# Patient Record
Sex: FEMALE | Race: BLACK OR AFRICAN AMERICAN | Employment: UNEMPLOYED | URBAN - METROPOLITAN AREA
[De-identification: names, ages, dates, MRNs, and addresses within clinical notes are randomized per-mention and may not be internally consistent; named-entity substitution may affect disease eponyms.]

---

## 2017-06-29 ENCOUNTER — ED HISTORICAL/CONVERTED ENCOUNTER (OUTPATIENT)
Dept: OTHER | Age: 52
End: 2017-06-29

## 2020-07-29 VITALS
OXYGEN SATURATION: 97 % | WEIGHT: 181.9 LBS | HEART RATE: 69 BPM | HEIGHT: 68 IN | BODY MASS INDEX: 27.57 KG/M2 | TEMPERATURE: 97.9 F | SYSTOLIC BLOOD PRESSURE: 178 MMHG | DIASTOLIC BLOOD PRESSURE: 94 MMHG

## 2020-07-29 PROBLEM — I10 HYPERTENSIVE DISORDER: Status: ACTIVE | Noted: 2020-07-29

## 2020-07-29 PROBLEM — Z98.890 HISTORY OF RECENT DENTAL PROCEDURE: Status: ACTIVE | Noted: 2020-07-29

## 2020-07-29 PROBLEM — E78.5 HYPERLIPIDEMIA: Status: ACTIVE | Noted: 2020-07-29

## 2020-07-29 RX ORDER — ERGOCALCIFEROL 1.25 MG/1
50000 CAPSULE ORAL
COMMUNITY
End: 2021-03-11 | Stop reason: ALTCHOICE

## 2020-07-29 RX ORDER — ISOPROPYL ALCOHOL 70 ML/100ML
SWAB TOPICAL
COMMUNITY
End: 2022-02-23 | Stop reason: SDUPTHER

## 2020-07-29 RX ORDER — LIRAGLUTIDE 6 MG/ML
0.6 INJECTION SUBCUTANEOUS
COMMUNITY
End: 2020-09-04

## 2020-07-29 RX ORDER — MECLIZINE HYDROCHLORIDE 25 MG/1
TABLET ORAL
COMMUNITY

## 2020-07-29 RX ORDER — LABETALOL 100 MG/1
TABLET, FILM COATED ORAL 2 TIMES DAILY
COMMUNITY
End: 2021-04-20

## 2020-07-29 RX ORDER — INSULIN GLARGINE 100 [IU]/ML
INJECTION, SOLUTION SUBCUTANEOUS
COMMUNITY
End: 2021-03-11 | Stop reason: SDUPTHER

## 2020-07-29 RX ORDER — LANOLIN ALCOHOL/MO/W.PET/CERES
1000 CREAM (GRAM) TOPICAL DAILY
COMMUNITY
End: 2021-03-11 | Stop reason: ALTCHOICE

## 2020-07-29 RX ORDER — ATORVASTATIN CALCIUM 80 MG/1
80 TABLET, FILM COATED ORAL DAILY
COMMUNITY
End: 2021-03-11 | Stop reason: ALTCHOICE

## 2020-07-29 RX ORDER — METFORMIN HYDROCHLORIDE 500 MG/1
TABLET, FILM COATED, EXTENDED RELEASE ORAL
COMMUNITY
End: 2021-03-11 | Stop reason: ALTCHOICE

## 2020-07-29 RX ORDER — OMEPRAZOLE 20 MG/1
20 CAPSULE, DELAYED RELEASE ORAL DAILY
COMMUNITY

## 2020-07-29 RX ORDER — CALCIUM CITRATE/VITAMIN D3 200MG-6.25
TABLET ORAL SEE ADMIN INSTRUCTIONS
COMMUNITY
End: 2021-03-11 | Stop reason: SDUPTHER

## 2020-07-29 RX ORDER — ACETAMINOPHEN 500 MG
TABLET ORAL
COMMUNITY

## 2020-07-29 RX ORDER — ASPIRIN 81 MG/1
TABLET ORAL DAILY
COMMUNITY
End: 2021-04-20

## 2020-07-29 RX ORDER — LATANOPROST 50 UG/ML
1 SOLUTION/ DROPS OPHTHALMIC
COMMUNITY

## 2020-07-29 RX ORDER — MAGNESIUM 200 MG
1000 TABLET ORAL DAILY
COMMUNITY
End: 2022-02-23

## 2020-07-29 RX ORDER — CLINDAMYCIN HYDROCHLORIDE 150 MG/1
CAPSULE ORAL EVERY 6 HOURS
COMMUNITY
End: 2021-03-11 | Stop reason: ALTCHOICE

## 2020-07-29 RX ORDER — LANCING DEVICE
EACH MISCELLANEOUS
COMMUNITY

## 2020-07-29 RX ORDER — ATORVASTATIN CALCIUM 40 MG/1
TABLET, FILM COATED ORAL DAILY
COMMUNITY
End: 2021-03-11 | Stop reason: ALTCHOICE

## 2020-07-29 RX ORDER — IRBESARTAN 150 MG/1
150 TABLET ORAL
COMMUNITY

## 2020-07-29 RX ORDER — BLOOD-GLUCOSE CONTROL, NORMAL
EACH MISCELLANEOUS
COMMUNITY
End: 2021-03-11 | Stop reason: SDUPTHER

## 2020-07-29 RX ORDER — AMLODIPINE BESYLATE 5 MG/1
5 TABLET ORAL DAILY
COMMUNITY
End: 2021-04-20

## 2020-07-29 RX ORDER — PEN NEEDLE, DIABETIC 30 GX3/16"
NEEDLE, DISPOSABLE MISCELLANEOUS
COMMUNITY
End: 2021-03-11 | Stop reason: SDUPTHER

## 2020-09-04 RX ORDER — LIRAGLUTIDE 6 MG/ML
INJECTION SUBCUTANEOUS
Qty: 6 ML | Refills: 3 | Status: SHIPPED | OUTPATIENT
Start: 2020-09-04 | End: 2021-03-11 | Stop reason: ALTCHOICE

## 2020-09-09 ENCOUNTER — ED HISTORICAL/CONVERTED ENCOUNTER (OUTPATIENT)
Dept: OTHER | Age: 55
End: 2020-09-09

## 2021-03-11 ENCOUNTER — OFFICE VISIT (OUTPATIENT)
Dept: ENDOCRINOLOGY | Age: 56
End: 2021-03-11
Payer: MEDICAID

## 2021-03-11 VITALS
TEMPERATURE: 98.6 F | DIASTOLIC BLOOD PRESSURE: 112 MMHG | HEIGHT: 60 IN | HEART RATE: 73 BPM | WEIGHT: 168 LBS | OXYGEN SATURATION: 98 % | SYSTOLIC BLOOD PRESSURE: 178 MMHG | BODY MASS INDEX: 32.98 KG/M2

## 2021-03-11 DIAGNOSIS — E11.65 TYPE 2 DIABETES MELLITUS WITH HYPERGLYCEMIA, WITH LONG-TERM CURRENT USE OF INSULIN (HCC): Primary | ICD-10-CM

## 2021-03-11 DIAGNOSIS — E78.2 MIXED HYPERLIPIDEMIA: ICD-10-CM

## 2021-03-11 DIAGNOSIS — Z79.4 TYPE 2 DIABETES MELLITUS WITH HYPERGLYCEMIA, WITH LONG-TERM CURRENT USE OF INSULIN (HCC): Primary | ICD-10-CM

## 2021-03-11 DIAGNOSIS — E11.65 UNCONTROLLED TYPE 2 DIABETES MELLITUS WITH HYPERGLYCEMIA (HCC): ICD-10-CM

## 2021-03-11 PROBLEM — Z91.14 NONCOMPLIANCE WITH MEDICATION REGIMEN: Status: ACTIVE | Noted: 2021-03-11

## 2021-03-11 LAB
BILIRUB UR QL STRIP: NEGATIVE
GLUCOSE POC: 324 MG/DL
GLUCOSE UR-MCNC: NEGATIVE MG/DL
HBA1C MFR BLD HPLC: 14 %
KETONES P FAST UR STRIP-MCNC: NEGATIVE MG/DL
PH UR STRIP: 5.5 [PH] (ref 4.6–8)
PROT UR QL STRIP: NEGATIVE
SP GR UR STRIP: 1.03 (ref 1–1.03)
UA UROBILINOGEN AMB POC: NORMAL (ref 0.2–1)
URINALYSIS CLARITY POC: CLEAR
URINALYSIS COLOR POC: YELLOW
URINE BLOOD POC: NEGATIVE
URINE LEUKOCYTES POC: NEGATIVE
URINE NITRITES POC: NEGATIVE

## 2021-03-11 PROCEDURE — 99215 OFFICE O/P EST HI 40 MIN: CPT | Performed by: INTERNAL MEDICINE

## 2021-03-11 PROCEDURE — 82962 GLUCOSE BLOOD TEST: CPT | Performed by: INTERNAL MEDICINE

## 2021-03-11 PROCEDURE — 83036 HEMOGLOBIN GLYCOSYLATED A1C: CPT | Performed by: INTERNAL MEDICINE

## 2021-03-11 PROCEDURE — 81003 URINALYSIS AUTO W/O SCOPE: CPT | Performed by: INTERNAL MEDICINE

## 2021-03-11 RX ORDER — INSULIN GLARGINE 100 [IU]/ML
INJECTION, SOLUTION SUBCUTANEOUS
Qty: 3 ADJUSTABLE DOSE PRE-FILLED PEN SYRINGE | Refills: 3 | Status: SHIPPED | OUTPATIENT
Start: 2021-03-11 | End: 2021-05-13 | Stop reason: SDUPTHER

## 2021-03-11 RX ORDER — CALCIUM CITRATE/VITAMIN D3 200MG-6.25
TABLET ORAL SEE ADMIN INSTRUCTIONS
Qty: 100 STRIP | Refills: 3 | Status: SHIPPED | OUTPATIENT
Start: 2021-03-11 | End: 2022-02-23 | Stop reason: SDUPTHER

## 2021-03-11 RX ORDER — SITAGLIPTIN 100 MG/1
100 TABLET, FILM COATED ORAL DAILY
Qty: 30 TAB | Refills: 3 | Status: SHIPPED | OUTPATIENT
Start: 2021-03-11 | End: 2021-04-10

## 2021-03-11 RX ORDER — IBUPROFEN 800 MG/1
TABLET ORAL
COMMUNITY
Start: 2021-02-09 | End: 2021-04-16 | Stop reason: SINTOL

## 2021-03-11 RX ORDER — PIOGLITAZONEHYDROCHLORIDE 45 MG/1
45 TABLET ORAL DAILY
Qty: 30 TAB | Refills: 3 | Status: SHIPPED | OUTPATIENT
Start: 2021-03-11 | End: 2021-04-10

## 2021-03-11 RX ORDER — OFLOXACIN 3 MG/ML
SOLUTION/ DROPS OPHTHALMIC
COMMUNITY
Start: 2021-02-09

## 2021-03-11 RX ORDER — BLOOD-GLUCOSE CONTROL, NORMAL
EACH MISCELLANEOUS
Qty: 100 LANCET | Refills: 3 | Status: SHIPPED | OUTPATIENT
Start: 2021-03-11 | End: 2022-02-23 | Stop reason: SDUPTHER

## 2021-03-11 RX ORDER — PEN NEEDLE, DIABETIC 30 GX3/16"
NEEDLE, DISPOSABLE MISCELLANEOUS
Qty: 1 PACKAGE | Refills: 3 | Status: SHIPPED | OUTPATIENT
Start: 2021-03-11 | End: 2021-05-13 | Stop reason: SDUPTHER

## 2021-03-11 RX ORDER — ATORVASTATIN CALCIUM 80 MG/1
80 TABLET, FILM COATED ORAL
Qty: 30 TAB | Refills: 3 | Status: SHIPPED | OUTPATIENT
Start: 2021-03-11 | End: 2021-04-10

## 2021-03-11 RX ORDER — SITAGLIPTIN 100 MG/1
TABLET, FILM COATED ORAL
COMMUNITY
Start: 2021-02-09 | End: 2021-03-11 | Stop reason: SDUPTHER

## 2021-03-11 NOTE — LETTER
3/11/2021 Patient: Lori Wick YOB: 1965 Date of Visit: 3/11/2021 Niki Mott NP 
[address] [place] [id number] Via Fax: [phone] Dear Niki Mott NP, Thank you for referring Ms. Alesha Farley to [address] [place] [address] for evaluation. My notes for this consultation are attached. If you have questions, please do not hesitate to call me. I look forward to following your patient along with you. Sincerely, Nikki Luciano MD

## 2021-03-11 NOTE — PROGRESS NOTES
History and Physical    Patient: Alesha Yoon MRN: [id number]  SSN: [id number]    YOB: 1965  Age: 61 y.o. Sex: female      Subjective:      Alesha Yoon is a 61 y.o. female with past medical history of hypertension, hyperlipidemia is here for follow-up of type 2 diabetes mellitus. She was ent to me by primary care provider Claudia Ferguson NP. Patient was seen only once, 13 months back. At the last visit patient was describing excessive hunger. She was started on Victoza 0.6 mg daily and then she was advised to increase it to 1.2 mg daily after a couple weeks. However, when she started to take Victoza, her appetite was suppressed and she tells me that she did not eat anything for 3 days because she had no appetite, she did not like this, so she stopped taking it and she does not want to go back on it. At the last visit I started her on Metformin  mg, advised her to take only 1 tablet with dinner. However, this makes her constipated and flares of her hemorrhoids, so she stopped taking it and she does not want to take it. She is supposed to be on Lantus 30 units daily at bedtime. However, when she takes Lantus, it makes her wake up several times at night and get hungry. So she does not take it much. Patient has polyuria, polydipsia, unintentional weight loss. Overall she is still noncompliant with diabetic diet but she says that she has made some changes, she is trying to use artificial sweeteners instead of sugar in her Karsten-Aid, she tries to go out and walk a lot, so that she does not have access to her kitchen, that way she will eat less. She is trying to decrease the number of crackers she eats, trying to eat more fruit. She tells me that she checks her blood sugar at home, but she did not bring her glucometer and she cannot give me any numbers from her memory. She is due for diabetic eye exam and she needs to call and make an appointment.   Regarding hyperlipidemia: She was on atorvastatin 40 mg daily. However her cholesterol was still high so PCP switched her to pravastatin 20 mg, but pravastatin caused her to have hair fall, so she stopped taking it. Glucometer reading: Patient did not bring her glucometer today    Updated diabetes history:  · Diagnosis: 8 years    · Current treatment: Lantus 30 units at hs,     · Past treatment: metformin ER (constipation and hemorrhoids flaring up), Victoza (decreased appetite that patient doesn't llike)    · Glucose checks: once a day, 200-300s    · Hyperglycemia: yes    · Hypoglycemia: no    · Meals per day: 3, breakfast: sausage, and biscuit, egg, lunch: turkey sandwich, or lunch meat, etc sandwich, dinner: same as lunch: snacks: snacks all day and night, junk food, candy bars, cereal, fruit, sweet tea,     · Exercise: walks    · DM related hospitalizations: no        Complications of DM:    · CAD: no    · CVA: yes, ,     · PVD: no    · Amputations: no     · Retinopathy: no; last exam was 2019    · Gastropathy: no    · Nephropathy: no    · Neuropathy: yes        Medications:    · Statin: Pravastatin 20    · ACE-I: no    · ASA: yes      · Diabetes education: no    Past Medical History:   Diagnosis Date    History of recent dental procedure 2020    Hyperlipidemia 2020    Hypertensive disorder 2020    Type II diabetes mellitus, uncontrolled ([facility] [address] 2020     Past Surgical History:   Procedure Laterality Date    HX  SECTION        Family History   Problem Relation Age of Onset    Diabetes Mother     Diabetes Paternal Grandmother      Social History     Tobacco Use    Smoking status: Current Every Day Smoker    Smokeless tobacco: Never Used   Substance Use Topics    Alcohol use: Not Currently      Prior to Admission medications    Medication Sig Start Date End Date Taking?  Authorizing Provider   ofloxacin (FLOXIN) 0.3 % ophthalmic solution instill 10 DROPS into THE affected ear ONCE A DAY FOR 7 DAYS 2/9/21  Yes Provider, Historical   ibuprofen (MOTRIN) 800 mg tablet TAKE ONE TABLET BY MOUTH THREE TIMES DAILY AS NEEDED WITH FOOD OR MILK 2/9/21  Yes Provider, Historical   pioglitazone (ACTOS) 45 mg tablet Take 1 Tab by mouth daily for 30 days. 3/11/21 4/10/21 Yes Aldo Waters MD   Januvia 100 mg tablet Take 1 Tab by mouth daily for 30 days. 3/11/21 4/10/21 Yes Aldo Waters MD   insulin glargine (Lantus Solostar U-100 Insulin) 100 unit/mL (3 mL) inpn 30 units every morning 3/11/21  Yes Aldo Waters MD   Insulin Needles, Disposable, (BD Ultra-Fine Short Pen Needle) 31 gauge x 5/16\" ndle Once a day 3/11/21  Yes Aldo Waters MD   glucose blood VI test strips (True Metrix Glucose Test Strip) strip by Does Not Apply route See Admin Instructions. Twice a day 3/11/21  Yes Aldo Waters MD   lancets (Ultra-Care Lancets) 30 gauge misc Twice a day 3/11/21  Yes Aldo Waters MD   atorvastatin (LIPITOR) 80 mg tablet Take 1 Tab by mouth nightly for 30 days. 3/11/21 4/10/21 Yes Aldo Waters MD   alcohol swabs (Alcohol Pads) padm by Apply Externally route. Yes Provider, Historical   amLODIPine (NORVASC) 5 mg tablet Take 5 mg by mouth daily. Yes Provider, Historical   aspirin delayed-release 81 mg tablet Take  by mouth daily. Yes Provider, Historical   cyanocobalamin (VITAMIN B-12) 1,000 mcg sublingual tablet Take 1,000 mcg by mouth daily. Yes Provider, Historical   irbesartan (AVAPRO) 150 mg tablet Take 150 mg by mouth nightly. Yes Provider, Historical   labetaloL (NORMODYNE) 100 mg tablet Take  by mouth two (2) times a day. Yes Provider, Historical   Lancing Device misc by Does Not Apply route. Yes Provider, Historical   latanoprost (XALATAN) 0.005 % ophthalmic solution Administer 1 Drop to both eyes nightly. Yes Provider, Historical   meclizine (ANTIVERT) 25 mg tablet Take  by mouth three (3) times daily as needed for Dizziness.    Yes Provider, Historical   omeprazole (PRILOSEC) 20 mg capsule Take 20 mg by mouth daily. Yes Provider, Historical   acetaminophen (TYLENOL) 500 mg tablet Take  by mouth every six (6) hours as needed for Pain. Provider, Historical        Allergies   Allergen Reactions    Penicillins Other (comments)     Yeast Infection    Percocet [Oxycodone-Acetaminophen] Hives       Review of Systems:  ROS    A comprehensive review of systems was preformed and it is negative except mentioned in HPI    Objective:     Vitals:    03/11/21 1009 03/11/21 1020   BP: (!) 173/104 (!) 178/112   Pulse: 75 73   Temp: 98.6 °F (37 °C)    TempSrc: Temporal    SpO2: 98%    Weight: 168 lb (76.2 kg)    Height: 5' (1.524 m)         Physical Exam:    Physical Exam  Vitals signs and nursing note reviewed. Constitutional:       Appearance: Normal appearance. HENT:      Head: Normocephalic and atraumatic. Cardiovascular:      Rate and Rhythm: Normal rate and regular rhythm. Pulmonary:      Effort: Pulmonary effort is normal.      Breath sounds: Normal breath sounds. Neurological:      Mental Status: She is alert. diabetic foot exam:  Bilateral diabetic foot exam was performed today. Dorsalis pedis pulses 2+ bilaterally. Monofilament sensation normal bilaterally. No ulcers or skin breakdown. Labs and Imaging:    Last 3 Recorded Weights in this Encounter    03/11/21 1009   Weight: 168 lb (76.2 kg)        No results found for: HBA1C, HGBE8, SXY5YOBM, ZKJ3ZHYG, ESK4PCNQ     Assessment:     Patient Active Problem List   Diagnosis Code    Type II diabetes mellitus, uncontrolled ([facility] [address] E11.65    Hyperlipidemia E78.5    Hypertensive disorder I10    History of recent dental procedure Z98.890    Type 2 diabetes mellitus with hyperglycemia, with long-term current use of insulin ([facility] [address] E11.65, Z79.4    Noncompliance with medication regimen Z91.14           Plan:     type II diabetes mellitus uncontrolled  Hemoglobin A1c was 11.9% on 1-, 14% today.     Fingerstick blood glucose is 324 mg/dL in my office today. Urine is negative for ketones    Up to date with diabetes related annual labs: no    Up to date with diabetic eye exam: no    Plan:  Since patient does not want to take Lantus at night, advised patient to take Lantus in the morning every day. Continue Januvia 100 mg daily since she is tolerating it well. Start pioglitazone 45 mg daily. Check blood glucose twice a day and bring glucometer to next visit in 6 weeks. Discussed with patient about cutting down on snacking, avoiding sugary beverages. Encouraged patient to call and make appointment for diabetic eye exam.    essential hypertension  Blood pressure is very high today as patient did not take blood pressure medication this morning. Discussed about compliance. Patient has microalbuminuria. mixed hyperlipidemia  Check lipid profile. Previously on atorvastatin 40 mg, which was switched to pravastatin 20 mg by PCP. However, patient has hair fall from pravastatin. So I am switching her back to atorvastatin and I am going to increase it to 80 mg daily to be taken at bedtime. Noncompliance with medication regimen    Time spent with patient: 45 minutes    Orders Placed This Encounter    LIPID PANEL    METABOLIC PANEL, COMPREHENSIVE    TSH RFX ON ABNORMAL TO FREE T4    MICROALBUMIN, UR, RAND W/ MICROALB/CREAT RATIO    AMB POC GLUCOSE BLOOD, BY GLUCOSE MONITORING DEVICE    AMB POC HEMOGLOBIN A1C    AMB POC URINALYSIS DIP STICK AUTO W/O MICRO    pioglitazone (ACTOS) 45 mg tablet     Sig: Take 1 Tab by mouth daily for 30 days. Dispense:  30 Tab     Refill:  3    Januvia 100 mg tablet     Sig: Take 1 Tab by mouth daily for 30 days.      Dispense:  30 Tab     Refill:  3    insulin glargine (Lantus Solostar U-100 Insulin) 100 unit/mL (3 mL) inpn     Si units every morning     Dispense:  3 Adjustable Dose Pre-filled Pen Syringe     Refill:  3    Insulin Needles, Disposable, (BD Ultra-Fine Short Pen Needle) 31 gauge x 516\" ndle     Sig: Once a day     Dispense:  1 Package     Refill:  3    glucose blood VI test strips (True Metrix Glucose Test Strip) strip     Sig: by Does Not Apply route See Admin Instructions. Twice a day     Dispense:  100 Strip     Refill:  3    lancets (Ultra-Care Lancets) 30 gauge misc     Sig: Twice a day     Dispense:  100 Lancet     Refill:  3    atorvastatin (LIPITOR) 80 mg tablet     Sig: Take 1 Tab by mouth nightly for 30 days.      Dispense:  30 Tab     Refill:  3     DC Pravastatin, atorvastatin 40        Signed By: Nikki Luciano MD     March 11, 2021      Return to clinic 6 weeks

## 2021-03-12 LAB
ALBUMIN SERPL-MCNC: 4 G/DL (ref 3.8–4.9)
ALBUMIN/CREAT UR: 6 MG/G CREAT (ref 0–29)
ALBUMIN/GLOB SERPL: 1.3 {RATIO} (ref 1.2–2.2)
ALP SERPL-CCNC: 128 IU/L (ref 39–117)
ALT SERPL-CCNC: 26 IU/L (ref 0–32)
AST SERPL-CCNC: 17 IU/L (ref 0–40)
BILIRUB SERPL-MCNC: 0.2 MG/DL (ref 0–1.2)
BUN SERPL-MCNC: 11 MG/DL (ref 6–24)
BUN/CREAT SERPL: 11 (ref 9–23)
CALCIUM SERPL-MCNC: 9.6 MG/DL (ref 8.7–10.2)
CHLORIDE SERPL-SCNC: 97 MMOL/L (ref 96–106)
CHOLEST SERPL-MCNC: 219 MG/DL (ref 100–199)
CO2 SERPL-SCNC: 25 MMOL/L (ref 20–29)
CREAT SERPL-MCNC: 0.99 MG/DL (ref 0.57–1)
CREAT UR-MCNC: 167.6 MG/DL
GLOBULIN SER CALC-MCNC: 3 G/DL (ref 1.5–4.5)
GLUCOSE SERPL-MCNC: 317 MG/DL (ref 65–99)
HDLC SERPL-MCNC: 44 MG/DL
LDLC SERPL CALC-MCNC: 154 MG/DL (ref 0–99)
MICROALBUMIN UR-MCNC: 10.2 UG/ML
POTASSIUM SERPL-SCNC: 4.5 MMOL/L (ref 3.5–5.2)
PROT SERPL-MCNC: 7 G/DL (ref 6–8.5)
SODIUM SERPL-SCNC: 136 MMOL/L (ref 134–144)
TRIGL SERPL-MCNC: 119 MG/DL (ref 0–149)
TSH SERPL DL<=0.005 MIU/L-ACNC: 0.95 UIU/ML (ref 0.45–4.5)
VLDLC SERPL CALC-MCNC: 21 MG/DL (ref 5–40)

## 2021-04-16 ENCOUNTER — HOSPITAL ENCOUNTER (INPATIENT)
Age: 56
LOS: 4 days | Discharge: HOME | DRG: 044 | End: 2021-04-20
Attending: ANESTHESIOLOGY | Admitting: ANESTHESIOLOGY
Payer: MEDICAID

## 2021-04-16 ENCOUNTER — APPOINTMENT (OUTPATIENT)
Dept: CT IMAGING | Age: 56
DRG: 044 | End: 2021-04-16
Attending: NURSE PRACTITIONER
Payer: MEDICAID

## 2021-04-16 ENCOUNTER — APPOINTMENT (OUTPATIENT)
Dept: GENERAL RADIOLOGY | Age: 56
End: 2021-04-16
Attending: STUDENT IN AN ORGANIZED HEALTH CARE EDUCATION/TRAINING PROGRAM
Payer: MEDICAID

## 2021-04-16 ENCOUNTER — APPOINTMENT (OUTPATIENT)
Dept: CT IMAGING | Age: 56
End: 2021-04-16
Attending: STUDENT IN AN ORGANIZED HEALTH CARE EDUCATION/TRAINING PROGRAM
Payer: MEDICAID

## 2021-04-16 ENCOUNTER — HOSPITAL ENCOUNTER (EMERGENCY)
Age: 56
Discharge: ANOTHER HOSPITAL | End: 2021-04-16
Attending: STUDENT IN AN ORGANIZED HEALTH CARE EDUCATION/TRAINING PROGRAM | Admitting: STUDENT IN AN ORGANIZED HEALTH CARE EDUCATION/TRAINING PROGRAM
Payer: MEDICAID

## 2021-04-16 ENCOUNTER — APPOINTMENT (OUTPATIENT)
Dept: NON INVASIVE DIAGNOSTICS | Age: 56
DRG: 044 | End: 2021-04-16
Attending: NURSE PRACTITIONER
Payer: MEDICAID

## 2021-04-16 ENCOUNTER — APPOINTMENT (OUTPATIENT)
Dept: MRI IMAGING | Age: 56
DRG: 044 | End: 2021-04-16
Attending: NURSE PRACTITIONER
Payer: MEDICAID

## 2021-04-16 VITALS
TEMPERATURE: 98 F | DIASTOLIC BLOOD PRESSURE: 86 MMHG | OXYGEN SATURATION: 100 % | HEIGHT: 60 IN | SYSTOLIC BLOOD PRESSURE: 149 MMHG | HEART RATE: 81 BPM | RESPIRATION RATE: 20 BRPM | WEIGHT: 186 LBS | BODY MASS INDEX: 36.52 KG/M2

## 2021-04-16 DIAGNOSIS — I61.3 NONTRAUMATIC INTRACEREBRAL HEMORRHAGE IN BRAINSTEM, UNSPECIFIED LATERALITY (HCC): ICD-10-CM

## 2021-04-16 DIAGNOSIS — I62.9 ACUTE INTRACRANIAL HEMORRHAGE (HCC): Primary | ICD-10-CM

## 2021-04-16 PROBLEM — I61.9 ICH (INTRACEREBRAL HEMORRHAGE) (HCC): Status: ACTIVE | Noted: 2021-04-16

## 2021-04-16 LAB
ALBUMIN SERPL-MCNC: 2.8 G/DL (ref 3.5–5)
ALBUMIN/GLOB SERPL: 0.7 {RATIO} (ref 1.1–2.2)
ALP SERPL-CCNC: 129 U/L (ref 45–117)
ALT SERPL-CCNC: 30 U/L (ref 12–78)
AMPHET UR QL SCN: NEGATIVE
ANION GAP SERPL CALC-SCNC: 5 MMOL/L (ref 5–15)
APPEARANCE UR: ABNORMAL
AST SERPL W P-5'-P-CCNC: 15 U/L (ref 15–37)
ATRIAL RATE: 65 BPM
BACTERIA URNS QL MICRO: NEGATIVE /HPF
BARBITURATES UR QL SCN: NEGATIVE
BASOPHILS # BLD: 0 K/UL (ref 0–0.1)
BASOPHILS NFR BLD: 1 % (ref 0–1)
BENZODIAZ UR QL: NEGATIVE
BILIRUB SERPL-MCNC: 0.2 MG/DL (ref 0.2–1)
BILIRUB UR QL: NEGATIVE
BUN SERPL-MCNC: 12 MG/DL (ref 6–20)
BUN/CREAT SERPL: 10 (ref 12–20)
CA-I BLD-MCNC: 8.7 MG/DL (ref 8.5–10.1)
CALCULATED P AXIS, ECG09: 43 DEGREES
CALCULATED R AXIS, ECG10: -11 DEGREES
CALCULATED T AXIS, ECG11: 133 DEGREES
CANNABINOIDS UR QL SCN: NEGATIVE
CHLORIDE SERPL-SCNC: 105 MMOL/L (ref 97–108)
CHOLEST SERPL-MCNC: 200 MG/DL
CO2 SERPL-SCNC: 26 MMOL/L (ref 21–32)
COCAINE UR QL SCN: POSITIVE
COLOR UR: ABNORMAL
CREAT SERPL-MCNC: 1.22 MG/DL (ref 0.55–1.02)
DIAGNOSIS, 93000: NORMAL
DIFFERENTIAL METHOD BLD: ABNORMAL
DRUG SCRN COMMENT,DRGCM: ABNORMAL
ECHO AO ROOT DIAM: 2.83 CM
ECHO AV AREA PEAK VELOCITY: 2.02 CM2
ECHO AV AREA VTI: 2.31 CM2
ECHO AV AREA/BSA PEAK VELOCITY: 1.1 CM2/M2
ECHO AV AREA/BSA VTI: 1.3 CM2/M2
ECHO AV MEAN GRADIENT: 12.6 MMHG
ECHO AV PEAK GRADIENT: 21.64 MMHG
ECHO AV PEAK VELOCITY: 232.58 CM/S
ECHO AV VTI: 36.58 CM
ECHO LA AREA 4C: 13.45 CM2
ECHO LA MAJOR AXIS: 3.49 CM
ECHO LA MINOR AXIS: 1.93 CM
ECHO LA VOL 2C: 50.96 ML (ref 22–52)
ECHO LA VOL 4C: 30.42 ML (ref 22–52)
ECHO LA VOL BP: 41.72 ML (ref 22–52)
ECHO LA VOL/BSA BIPLANE: 23.05 ML/M2 (ref 16–28)
ECHO LA VOLUME INDEX A2C: 28.16 ML/M2 (ref 16–28)
ECHO LA VOLUME INDEX A4C: 16.81 ML/M2 (ref 16–28)
ECHO LV INTERNAL DIMENSION DIASTOLIC: 3.76 CM (ref 3.9–5.3)
ECHO LV INTERNAL DIMENSION SYSTOLIC: 2.37 CM
ECHO LV IVSD: 1.39 CM (ref 0.6–0.9)
ECHO LV MASS 2D: 179.6 G (ref 67–162)
ECHO LV MASS INDEX 2D: 99.2 G/M2 (ref 43–95)
ECHO LV POSTERIOR WALL DIASTOLIC: 1.3 CM (ref 0.6–0.9)
ECHO LVOT DIAM: 1.95 CM
ECHO LVOT PEAK GRADIENT: 9.86 MMHG
ECHO LVOT PEAK VELOCITY: 157.02 CM/S
ECHO LVOT SV: 84.6 ML
ECHO LVOT VTI: 28.24 CM
ECHO MV A VELOCITY: 126.56 CM/S
ECHO MV AREA PHT: 3.91 CM2
ECHO MV E DECELERATION TIME (DT): 194.25 MS
ECHO MV E VELOCITY: 105.96 CM/S
ECHO MV E/A RATIO: 0.84
ECHO MV PRESSURE HALF TIME (PHT): 56.33 MS
ECHO PV PEAK INSTANTANEOUS GRADIENT SYSTOLIC: 4.39 MMHG
ECHO RV INTERNAL DIMENSION: 3.17 CM
ECHO RV TAPSE: 1.93 CM (ref 1.5–2)
ECHO TV REGURGITANT MAX VELOCITY: 257.96 CM/S
ECHO TV REGURGITANT PEAK GRADIENT: 26.62 MMHG
EOSINOPHIL # BLD: 0.1 K/UL (ref 0–0.4)
EOSINOPHIL NFR BLD: 2 % (ref 0–7)
ERYTHROCYTE [DISTWIDTH] IN BLOOD BY AUTOMATED COUNT: 15.2 % (ref 11.5–14.5)
GLOBULIN SER CALC-MCNC: 4.2 G/DL (ref 2–4)
GLUCOSE BLD STRIP.AUTO-MCNC: 245 MG/DL (ref 65–100)
GLUCOSE BLD STRIP.AUTO-MCNC: 267 MG/DL (ref 65–100)
GLUCOSE BLD STRIP.AUTO-MCNC: 316 MG/DL (ref 65–100)
GLUCOSE BLD STRIP.AUTO-MCNC: 351 MG/DL (ref 65–100)
GLUCOSE SERPL-MCNC: 351 MG/DL (ref 65–100)
GLUCOSE UR STRIP.AUTO-MCNC: >300 MG/DL
HCT VFR BLD AUTO: 42.2 % (ref 35–47)
HDLC SERPL-MCNC: 62 MG/DL
HDLC SERPL: 3.2 {RATIO} (ref 0–5)
HGB BLD-MCNC: 13.9 G/DL (ref 11.5–16)
HGB UR QL STRIP: NEGATIVE
IMM GRANULOCYTES # BLD AUTO: 0 K/UL (ref 0–0.04)
IMM GRANULOCYTES NFR BLD AUTO: 1 % (ref 0–0.5)
INR PPP: 0.9 (ref 0.9–1.1)
KETONES UR QL STRIP.AUTO: NEGATIVE MG/DL
LDLC SERPL CALC-MCNC: 123 MG/DL (ref 0–100)
LEUKOCYTE ESTERASE UR QL STRIP.AUTO: NEGATIVE
LIPID PROFILE,FLP: ABNORMAL
LYMPHOCYTES # BLD: 2.6 K/UL (ref 0.8–3.5)
LYMPHOCYTES NFR BLD: 42 % (ref 12–49)
MCH RBC QN AUTO: 28.4 PG (ref 26–34)
MCHC RBC AUTO-ENTMCNC: 32.9 G/DL (ref 30–36.5)
MCV RBC AUTO: 86.1 FL (ref 80–99)
METHADONE UR QL: NEGATIVE
MONOCYTES # BLD: 0.6 K/UL (ref 0–1)
MONOCYTES NFR BLD: 11 % (ref 5–13)
NEUTS SEG # BLD: 2.6 K/UL (ref 1.8–8)
NEUTS SEG NFR BLD: 43 % (ref 32–75)
NITRITE UR QL STRIP.AUTO: NEGATIVE
NRBC # BLD: 0 K/UL (ref 0–0.01)
NRBC BLD-RTO: 0 PER 100 WBC
OPIATES UR QL: NEGATIVE
P-R INTERVAL, ECG05: 186 MS
PCP UR QL: NEGATIVE
PERFORMED BY, TECHID: ABNORMAL
PH UR STRIP: 6 [PH] (ref 5–8)
PLATELET # BLD AUTO: 401 K/UL (ref 150–400)
PMV BLD AUTO: 10.3 FL (ref 8.9–12.9)
POTASSIUM SERPL-SCNC: 4.1 MMOL/L (ref 3.5–5.1)
PROT SERPL-MCNC: 7 G/DL (ref 6.4–8.2)
PROT UR STRIP-MCNC: NEGATIVE MG/DL
PROTHROMBIN TIME: 12.5 SEC (ref 11.9–14.7)
Q-T INTERVAL, ECG07: 444 MS
QRS DURATION, ECG06: 88 MS
QTC CALCULATION (BEZET), ECG08: 461 MS
RBC # BLD AUTO: 4.9 M/UL (ref 3.8–5.2)
RBC #/AREA URNS HPF: ABNORMAL /HPF (ref 0–5)
SERVICE CMNT-IMP: ABNORMAL
SODIUM SERPL-SCNC: 136 MMOL/L (ref 136–145)
SP GR UR REFRACTOMETRY: 1.03 (ref 1–1.03)
TRIGL SERPL-MCNC: 75 MG/DL (ref ?–150)
TROPONIN I SERPL-MCNC: <0.05 NG/ML
UROBILINOGEN UR QL STRIP.AUTO: 0.1 EU/DL (ref 0.1–1)
VENTRICULAR RATE, ECG03: 65 BPM
VLDLC SERPL CALC-MCNC: 15 MG/DL
WBC # BLD AUTO: 6 K/UL (ref 3.6–11)
WBC URNS QL MICRO: ABNORMAL /HPF (ref 0–4)

## 2021-04-16 PROCEDURE — 84484 ASSAY OF TROPONIN QUANT: CPT

## 2021-04-16 PROCEDURE — 70450 CT HEAD/BRAIN W/O DYE: CPT

## 2021-04-16 PROCEDURE — 93306 TTE W/DOPPLER COMPLETE: CPT

## 2021-04-16 PROCEDURE — 80053 COMPREHEN METABOLIC PANEL: CPT

## 2021-04-16 PROCEDURE — 71045 X-RAY EXAM CHEST 1 VIEW: CPT

## 2021-04-16 PROCEDURE — 93005 ELECTROCARDIOGRAM TRACING: CPT

## 2021-04-16 PROCEDURE — 97161 PT EVAL LOW COMPLEX 20 MIN: CPT

## 2021-04-16 PROCEDURE — 74011000250 HC RX REV CODE- 250: Performed by: NURSE PRACTITIONER

## 2021-04-16 PROCEDURE — 99223 1ST HOSP IP/OBS HIGH 75: CPT | Performed by: PSYCHIATRY & NEUROLOGY

## 2021-04-16 PROCEDURE — 81001 URINALYSIS AUTO W/SCOPE: CPT

## 2021-04-16 PROCEDURE — 36415 COLL VENOUS BLD VENIPUNCTURE: CPT

## 2021-04-16 PROCEDURE — 85025 COMPLETE CBC W/AUTO DIFF WBC: CPT

## 2021-04-16 PROCEDURE — 99285 EMERGENCY DEPT VISIT HI MDM: CPT

## 2021-04-16 PROCEDURE — 74011250636 HC RX REV CODE- 250/636: Performed by: NURSE PRACTITIONER

## 2021-04-16 PROCEDURE — 75810000275 HC EMERGENCY DEPT VISIT NO LEVEL OF CARE

## 2021-04-16 PROCEDURE — 74011250636 HC RX REV CODE- 250/636: Performed by: STUDENT IN AN ORGANIZED HEALTH CARE EDUCATION/TRAINING PROGRAM

## 2021-04-16 PROCEDURE — 96376 TX/PRO/DX INJ SAME DRUG ADON: CPT

## 2021-04-16 PROCEDURE — APPNB30 APP NON BILLABLE TIME 0-30 MINS: Performed by: NURSE PRACTITIONER

## 2021-04-16 PROCEDURE — 97530 THERAPEUTIC ACTIVITIES: CPT

## 2021-04-16 PROCEDURE — 80307 DRUG TEST PRSMV CHEM ANLYZR: CPT

## 2021-04-16 PROCEDURE — 82962 GLUCOSE BLOOD TEST: CPT

## 2021-04-16 PROCEDURE — 92610 EVALUATE SWALLOWING FUNCTION: CPT | Performed by: SPEECH-LANGUAGE PATHOLOGIST

## 2021-04-16 PROCEDURE — 80061 LIPID PANEL: CPT

## 2021-04-16 PROCEDURE — 65610000006 HC RM INTENSIVE CARE

## 2021-04-16 PROCEDURE — 74011636637 HC RX REV CODE- 636/637: Performed by: INTERNAL MEDICINE

## 2021-04-16 PROCEDURE — 74011636637 HC RX REV CODE- 636/637: Performed by: NURSE PRACTITIONER

## 2021-04-16 PROCEDURE — 96374 THER/PROPH/DIAG INJ IV PUSH: CPT

## 2021-04-16 PROCEDURE — 70551 MRI BRAIN STEM W/O DYE: CPT

## 2021-04-16 PROCEDURE — 74011250637 HC RX REV CODE- 250/637: Performed by: STUDENT IN AN ORGANIZED HEALTH CARE EDUCATION/TRAINING PROGRAM

## 2021-04-16 PROCEDURE — 97165 OT EVAL LOW COMPLEX 30 MIN: CPT

## 2021-04-16 PROCEDURE — 85610 PROTHROMBIN TIME: CPT

## 2021-04-16 RX ORDER — HYDRALAZINE HYDROCHLORIDE 20 MG/ML
10 INJECTION INTRAMUSCULAR; INTRAVENOUS ONCE
Status: COMPLETED | OUTPATIENT
Start: 2021-04-16 | End: 2021-04-16

## 2021-04-16 RX ORDER — NALOXONE HYDROCHLORIDE 0.4 MG/ML
0.4 INJECTION, SOLUTION INTRAMUSCULAR; INTRAVENOUS; SUBCUTANEOUS AS NEEDED
Status: DISCONTINUED | OUTPATIENT
Start: 2021-04-16 | End: 2021-04-20 | Stop reason: HOSPADM

## 2021-04-16 RX ORDER — PANTOPRAZOLE SODIUM 40 MG/1
40 TABLET, DELAYED RELEASE ORAL
Status: DISCONTINUED | OUTPATIENT
Start: 2021-04-16 | End: 2021-04-20 | Stop reason: HOSPADM

## 2021-04-16 RX ORDER — INSULIN LISPRO 100 [IU]/ML
INJECTION, SOLUTION INTRAVENOUS; SUBCUTANEOUS EVERY 6 HOURS
Status: DISCONTINUED | OUTPATIENT
Start: 2021-04-16 | End: 2021-04-16

## 2021-04-16 RX ORDER — HYDRALAZINE HYDROCHLORIDE 20 MG/ML
20 INJECTION INTRAMUSCULAR; INTRAVENOUS ONCE
Status: COMPLETED | OUTPATIENT
Start: 2021-04-16 | End: 2021-04-16

## 2021-04-16 RX ORDER — DEXTROSE 50 % IN WATER (D50W) INTRAVENOUS SYRINGE
25-50 AS NEEDED
Status: DISCONTINUED | OUTPATIENT
Start: 2021-04-16 | End: 2021-04-20 | Stop reason: HOSPADM

## 2021-04-16 RX ORDER — LABETALOL HYDROCHLORIDE 5 MG/ML
10 INJECTION, SOLUTION INTRAVENOUS
Status: DISCONTINUED | OUTPATIENT
Start: 2021-04-16 | End: 2021-04-16

## 2021-04-16 RX ORDER — ONDANSETRON 2 MG/ML
4 INJECTION INTRAMUSCULAR; INTRAVENOUS
Status: DISCONTINUED | OUTPATIENT
Start: 2021-04-16 | End: 2021-04-20 | Stop reason: HOSPADM

## 2021-04-16 RX ORDER — POLYETHYLENE GLYCOL 3350 17 G/17G
17 POWDER, FOR SOLUTION ORAL DAILY PRN
Status: DISCONTINUED | OUTPATIENT
Start: 2021-04-16 | End: 2021-04-20 | Stop reason: HOSPADM

## 2021-04-16 RX ORDER — MAGNESIUM SULFATE 100 %
4 CRYSTALS MISCELLANEOUS AS NEEDED
Status: DISCONTINUED | OUTPATIENT
Start: 2021-04-16 | End: 2021-04-20 | Stop reason: HOSPADM

## 2021-04-16 RX ORDER — MECLIZINE HCL 12.5 MG 12.5 MG/1
25 TABLET ORAL
Status: DISCONTINUED | OUTPATIENT
Start: 2021-04-16 | End: 2021-04-17 | Stop reason: SDUPTHER

## 2021-04-16 RX ORDER — HYDRALAZINE HYDROCHLORIDE 20 MG/ML
10 INJECTION INTRAMUSCULAR; INTRAVENOUS
Status: COMPLETED | OUTPATIENT
Start: 2021-04-16 | End: 2021-04-16

## 2021-04-16 RX ORDER — SODIUM CHLORIDE 0.9 % (FLUSH) 0.9 %
5-40 SYRINGE (ML) INJECTION AS NEEDED
Status: DISCONTINUED | OUTPATIENT
Start: 2021-04-16 | End: 2021-04-20 | Stop reason: HOSPADM

## 2021-04-16 RX ORDER — ACETAMINOPHEN 325 MG/1
650 TABLET ORAL
Status: DISCONTINUED | OUTPATIENT
Start: 2021-04-16 | End: 2021-04-20 | Stop reason: HOSPADM

## 2021-04-16 RX ORDER — LABETALOL HYDROCHLORIDE 5 MG/ML
10 INJECTION, SOLUTION INTRAVENOUS
Status: COMPLETED | OUTPATIENT
Start: 2021-04-16 | End: 2021-04-16

## 2021-04-16 RX ORDER — INSULIN LISPRO 100 [IU]/ML
INJECTION, SOLUTION INTRAVENOUS; SUBCUTANEOUS
Status: DISCONTINUED | OUTPATIENT
Start: 2021-04-16 | End: 2021-04-20 | Stop reason: HOSPADM

## 2021-04-16 RX ORDER — HYDRALAZINE HYDROCHLORIDE 20 MG/ML
10 INJECTION INTRAMUSCULAR; INTRAVENOUS
Status: DISCONTINUED | OUTPATIENT
Start: 2021-04-16 | End: 2021-04-16

## 2021-04-16 RX ORDER — SODIUM CHLORIDE 0.9 % (FLUSH) 0.9 %
5-40 SYRINGE (ML) INJECTION EVERY 8 HOURS
Status: DISCONTINUED | OUTPATIENT
Start: 2021-04-16 | End: 2021-04-20 | Stop reason: HOSPADM

## 2021-04-16 RX ORDER — LABETALOL 100 MG/1
100 TABLET, FILM COATED ORAL 2 TIMES DAILY
Status: DISCONTINUED | OUTPATIENT
Start: 2021-04-16 | End: 2021-04-20

## 2021-04-16 RX ADMIN — INSULIN LISPRO 2 UNITS: 100 INJECTION, SOLUTION INTRAVENOUS; SUBCUTANEOUS at 22:18

## 2021-04-16 RX ADMIN — HYDRALAZINE HYDROCHLORIDE 10 MG: 20 INJECTION INTRAMUSCULAR; INTRAVENOUS at 03:10

## 2021-04-16 RX ADMIN — INSULIN LISPRO 5 UNITS: 100 INJECTION, SOLUTION INTRAVENOUS; SUBCUTANEOUS at 12:36

## 2021-04-16 RX ADMIN — INSULIN LISPRO 7 UNITS: 100 INJECTION, SOLUTION INTRAVENOUS; SUBCUTANEOUS at 17:45

## 2021-04-16 RX ADMIN — ONDANSETRON 4 MG: 2 INJECTION INTRAMUSCULAR; INTRAVENOUS at 10:41

## 2021-04-16 RX ADMIN — HYDRALAZINE HYDROCHLORIDE 10 MG: 20 INJECTION INTRAMUSCULAR; INTRAVENOUS at 21:58

## 2021-04-16 RX ADMIN — HYDRALAZINE HYDROCHLORIDE 20 MG: 20 INJECTION INTRAMUSCULAR; INTRAVENOUS at 02:24

## 2021-04-16 RX ADMIN — Medication 10 ML: at 22:09

## 2021-04-16 RX ADMIN — Medication 10 ML: at 06:00

## 2021-04-16 RX ADMIN — HYDRALAZINE HYDROCHLORIDE 10 MG: 20 INJECTION INTRAMUSCULAR; INTRAVENOUS at 21:36

## 2021-04-16 RX ADMIN — PANTOPRAZOLE SODIUM 40 MG: 40 TABLET, DELAYED RELEASE ORAL at 22:33

## 2021-04-16 RX ADMIN — LABETALOL HYDROCHLORIDE 10 MG: 5 INJECTION INTRAVENOUS at 22:07

## 2021-04-16 NOTE — ROUTINE PROCESS
TRANSFER - OUT REPORT:    Verbal report given to Johny Mora RN(name) on April R Brandon Howe  being transferred to 7S ICU(unit) for routine progression of care       Report consisted of patients Situation, Background, Assessment and   Recommendations(SBAR). Information from the following report(s) SBAR, ED Summary and MAR was reviewed with the receiving nurse. Lines:   Peripheral IV 04/15/21 Right Hand (Active)   Site Assessment Clean, dry, & intact 04/16/21 0951   Phlebitis Assessment 0 04/16/21 0951   Infiltration Assessment 0 04/16/21 0951   Dressing Status Clean, dry, & intact 04/16/21 0951       Peripheral IV 04/15/21 Right Forearm (Active)   Site Assessment Clean, dry, & intact 04/16/21 0952   Phlebitis Assessment 0 04/16/21 0952   Infiltration Assessment 0 04/16/21 0952   Dressing Status Clean, dry, & intact 04/16/21 8059        Opportunity for questions and clarification was provided.       Patient transported with:   Registered Nurse

## 2021-04-16 NOTE — CONSULTS
Neurology Consult  Maryan Méndez NP    Patient: Alesha Cabrera MRN: [id number]  SSN: [id number]    YOB: 1965  Age: 61 y.o. Sex: female      Chief Complaint:Double vision, facial numbness, bilateral finger numbness, and generalized weakness. Subjective:      Alesha Cabrera is a 61 y.o. female with a past history of DMII, CVA with no residual effects, HTN, hyperlipidemia, recent ear infection and smoking. States that Wednesday night while she was watching television she began having numbness of her nose and area around her mouth. She started having tingling in her fingers bilaterally. Generalized weakness gradually ensued and she could not stand. She also began having blurred and double vision that has been going on for the last 24 hours. Patient had a stroke in the past so she decided she to get evaluated in the ER. Patient had an elevated BP on arrival of 190/83. She was taken for [facility] which showed acute andressa hemorrhage 1 x 2 x 1.5 cm. No hydrocephalus. Dr. Antoni Vargas with [facility] was consulted. No neurosurgical intervention is indicated at this time. She was transferred to [facility] ER for a higher level of care where she is awaiting an ICU bed. Patient denies any antiplatelet or blood thinning medications. She denies trauma. Past Medical History:   Diagnosis Date    History of recent dental procedure 7/29/2020    Hyperlipidemia 7/29/2020    Hypertensive disorder 7/29/2020    Murmur     Stroke [facility])     Type II diabetes mellitus, uncontrolled ([address] 7/29/2020     Family History   Problem Relation Age of Onset    Diabetes Mother     Diabetes Paternal Grandmother      Social History     Tobacco Use    Smoking status: Current Every Day Smoker     Packs/day: 0.50    Smokeless tobacco: Never Used   Substance Use Topics    Alcohol use: Yes     Comment: daily      Prior to Admission Medications   Prescriptions Last Dose Informant Patient Reported? Taking?    Insulin Needles, Disposable, (BD Ultra-Fine Short Pen Needle) 31 gauge x 16\" ndle   No No   Sig: Once a day   Lancing Device misc   Yes No   Sig: by Does Not Apply route. acetaminophen (TYLENOL) 500 mg tablet   Yes No   Sig: Take  by mouth every six (6) hours as needed for Pain. alcohol swabs (Alcohol Pads) padm   Yes No   Sig: by Apply Externally route. amLODIPine (NORVASC) 5 mg tablet   Yes No   Sig: Take 5 mg by mouth daily. aspirin delayed-release 81 mg tablet   Yes No   Sig: Take  by mouth daily. cyanocobalamin (VITAMIN B-12) 1,000 mcg sublingual tablet   Yes No   Sig: Take 1,000 mcg by mouth daily. glucose blood VI test strips (True Metrix Glucose Test Strip) strip   No No   Sig: by Does Not Apply route See Admin Instructions. Twice a day   insulin glargine (Lantus Solostar U-100 Insulin) 100 unit/mL (3 mL) inpn   No No   Si units every morning   irbesartan (AVAPRO) 150 mg tablet   Yes No   Sig: Take 150 mg by mouth nightly. labetaloL (NORMODYNE) 100 mg tablet   Yes No   Sig: Take  by mouth two (2) times a day. lancets (Ultra-Care Lancets) 30 gauge misc   No No   Sig: Twice a day   latanoprost (XALATAN) 0.005 % ophthalmic solution   Yes No   Sig: Administer 1 Drop to both eyes nightly. meclizine (ANTIVERT) 25 mg tablet   Yes No   Sig: Take  by mouth three (3) times daily as needed for Dizziness. ofloxacin (FLOXIN) 0.3 % ophthalmic solution   Yes No   Sig: instill 10 DROPS into THE affected ear ONCE A DAY FOR 7 DAYS   omeprazole (PRILOSEC) 20 mg capsule   Yes No   Sig: Take 20 mg by mouth daily. Facility-Administered Medications: None       Allergies   Allergen Reactions    Penicillins Other (comments)     Yeast Infection    Percocet [Oxycodone-Acetaminophen] Hives       Review of Systems:  Denies  chest pain, leg pain, nausea, vomiting, difficulty swallowing, headache, and dyspnea. Complains of numbness, tingling, and double vision.      Objective:     Vitals:    21 0500 21 0515 21 0530 21 0553   BP: (!) 142/72  (!) 141/59    Pulse: 73 75 73    Resp: 17 17 20    Temp:       SpO2:  99% 100%    Weight:    84.4 kg (186 lb)   Height:    5' (1.524 m)      Physical Exam:  GENERAL: Calm, cooperative, NAD  SKIN: Warm, dry, color appropriate for ethnicity. Neurologic Exam:  Mental Status:  Alert and oriented x 4. Appropriate affect, mood and behavior. Language:    Normal fluency, repetition, comprehension and naming. Cranial Nerves:   Pupils 3 mm, equal, round and reactive to light. Visual fields full to confrontation. Extraocular movements intact. Facial sensation intact. Full facial strength, no asymmetry. Hearing grossly intact bilaterally. Mild dysarthria. Tongue protrudes to midline, palate elevates symmetrically. Shoulder shrug 5/5 bilaterally. Motor:    No pronator drift. Bulk and tone normal.      5/5 power in all extremities proximally and distally. No involuntary movements. Sensation:    Sensation intact throughout to light touch to cheeks, bilateral arms and bilateral lower legs. Numbness is stated to be on nose, around mouth and in fingers. Coordination & Gait: Normal. FTN and HTS intact with no ataxia present.     NIHSS:      1a-LOC:0    1b-Month/Age:0    1c-Open/Close Hand:0    2-Best Gaze:0    3-Visual Fields:0    4-Facial Palsy:0    5a-Left Arm:0    5b-Right Arm:0    6a-Left Le    6b-Right Le    7-Limb Ataxia:0    8-Sensory:1    9-Best Language:0    10-Dysarthria:1    11-Extinction/Inattention:0  TOTAL SCORE:2    Labs:  Lab Results   Component Value Date/Time    WBC 6.0 2021 01:47 AM    HGB 13.9 2021 01:47 AM    HCT 42.2 2021 01:47 AM    PLATELET 960 (H)  01:47 AM    MCV 86.1 2021 01:47 AM      Lab Results   Component Value Date/Time    Sodium 136 2021 01:47 AM    Potassium 4.1 2021 01:47 AM    Chloride 105 2021 01:47 AM    CO2 26 2021 01:47 AM    Anion gap 5 04/16/2021 01:47 AM    Glucose 351 (H) 04/16/2021 01:47 AM    BUN 12 04/16/2021 01:47 AM    Creatinine 1.22 (H) 04/16/2021 01:47 AM    BUN/Creatinine ratio 10 (L) 04/16/2021 01:47 AM    GFR est AA 55 (L) 04/16/2021 01:47 AM    GFR est non-AA 45 (L) 04/16/2021 01:47 AM    Calcium 8.7 04/16/2021 01:47 AM     Lab Results   Component Value Date/Time    Troponin-I, Qt. <0.05 04/16/2021 01:47 AM       Imaging:  CT Results (maximum last 3): Results from Hospital Encounter encounter on 04/16/21   CT HEAD WO CONT    Narrative HISTORY:  facial numbness, r/o cva  Dose reduction technique: All CT scans at this facility are performed using dose reduction optimization  technique as appropriate on the exam including the following: Automated exposure  control, adjustment of the MA and/or KV according to patient size and/or use of  iterative reconstructive technique. TECHNIQUE: [Without contrast]  COMPARISON: [None]  LIMITATIONS: [None]    BRAIN: Moderate patchy areas of subcortical and periventricular white matter  hypodensity. Irregular shaped focus of hyperdensity in the andressa measures up to  1.0 x 2.0 x 1.5 cm. VENTRICLES: No hydrocephalus. EXTRA-AXIAL SPACES/SULCI:  No extra-axial hemorrhage, fluid collection or mass. CALVARIUM/SKULL BASE: Normal    FACE/SINUSES: Heterogeneous partially hypodense and partially calcified  opacification of the right maxillary sinus. SOFT TISSUES: Normal    OTHER: None      Impression Findings compatible with acute andressa hemorrhage. Moderate patchy  white matter abnormality as described is nonspecific but could indicate sequela  of chronic small vessel disease. Chronic heterogeneous and partially hyperdense  opacification of the right maxillary sinus may indicate complex infection such  as fungal sinusitis.     Results called to Jacek Jordan on 4/16/2021 1:57 AM.     Assessment:     Hospital Problems  Date Reviewed: 3/11/2021          Codes Class Noted POA    ICH (intracerebral hemorrhage) ([facility] [address] ICD-10-CM: I61.9  ICD-9-CM: 421  4/16/2021 Unknown            Plan:   1) Intracerebral Hemorrhage, ICH score: 1   - CT head showed acute andressa hemorrhage. Moderate patchy  white matter abnormality as described is nonspecific but could indicate sequela of chronic small vessel disease. Chronic heterogeneous and partially hyperdense opacification of the right maxillary sinus may indicate complex infection such as fungal sinusitis. - SBP goal less than 160, Cardene/Labetalol PRN   - q1 neuro checks    - A1C and lipid panel pending, LDL goal <70   -Repeat CTH this am              - MRI brain ordered              - ECHO ordered              - PT/OT/SLP evals              - Stroke education   - NSGY following, no intervention indicated at this time. I have discussed the diagnosis and the intended plan as seen in the above orders with Dr. Loco Haas, the patient and the primary RN. Patient/family updated on current plan of care and is in agreement. All questions were answered. Thank you for this consult and participating in the care of this patient. Signed By: Kai Navarro NP     April 16, 2021      Staff Addendum:  I have reviewed the documentation provided by the nurse practitioner, discussed her findings, clinical impression, and the proposed management plans with regards to this encounter. I have personally evaluated the patient and verified the history and confirmed the physical findings. Below are my additional findings:    61year old AAF with a h/o HTN, HPL, DM, remote stroke, tobacco/substance abuse presenting with reported onset of face numbness/tingling b/l, hand numbness/tingling b/l, difficulty standing due to generalized weakness, dysarthria and diplopia which she first noted 2 days ago (4/14/21). Symptoms are largely unchanged from onset. BP on arrival was 190/83. Tox screen was positive for cocaine which she tells me she last used 1 week ago.  CT head/MRI Brain this admission noted an acute pontine ICH with associated edema, remote R thalamic infarct and R maxillary sinusitis. She was taking ASA 81mg daily PTA. On examination, she exhibits mild dysarthria without aphasia, PERRL, EOMI, VFF, face symmetric/facial sensation intact b/l, tongue ML, palate symmetric, LEY 5/5, sensation intact to LT throughout, DTR 2+/4 b/l UE/LE. Suspect hypertensive hemorrhage due to location and uncontrolled HTN on admission. Maintain SBP<160. Start high dose statin therapy. Hold antiplatelet therapy for at least 2 weeks in the setting of ICH-may consider resuming thereafter. PT/OT/ST evaluations. Stat head CT if any neurologic change/new focal deficits.      Breanna Glasgow DO  04/16/21

## 2021-04-16 NOTE — ED NOTES
Assumed care of patient. Verbal and bedside report received from Jm Ponce, [address]. Patient resting quietly on stretcher, visitor at bedside. Pt appears in no acute distress, respirations equal and unlabored. VS WNL. Call bell within reach.

## 2021-04-16 NOTE — ED NOTES
Patient incontinent of stool. Patient clean and skin protectant ointment applied to patients buttocks.

## 2021-04-16 NOTE — H&P
[facility]    ICU TEAM H&P    Name: Eugene Dutta   : 1965   MRN: [id number]   Date: 2021      Assessment:     ICU Problems:    1. Pontine Hemorrhage  2. Hypertension  3. Type 2 DM  4. Hyperlipidemia  5. Cocaine use  6. Tobacco abuse    Imaging:  CT Results  (Last 48 hours)               21 0137  CT HEAD WO CONT Final result    Impression:  Findings compatible with acute andressa hemorrhage. Moderate patchy   white matter abnormality as described is nonspecific but could indicate sequela   of chronic small vessel disease. Chronic heterogeneous and partially hyperdense   opacification of the right maxillary sinus may indicate complex infection such   as fungal sinusitis. Results called to Colleen Olmstead on 2021 1:57 AM.       Narrative:  HISTORY:  facial numbness, r/o cva   Dose reduction technique: All CT scans at this facility are performed using dose reduction optimization   technique as appropriate on the exam including the following: Automated exposure   control, adjustment of the MA and/or KV according to patient size and/or use of   iterative reconstructive technique. TECHNIQUE: [Without contrast]   COMPARISON: [None]   LIMITATIONS: [None]       BRAIN: Moderate patchy areas of subcortical and periventricular white matter   hypodensity. Irregular shaped focus of hyperdensity in the andressa measures up to   1.0 x 2.0 x 1.5 cm. VENTRICLES: No hydrocephalus. EXTRA-AXIAL SPACES/SULCI:  No extra-axial hemorrhage, fluid collection or mass. CALVARIUM/SKULL BASE: Normal       FACE/SINUSES: Heterogeneous partially hypodense and partially calcified   opacification of the right maxillary sinus. SOFT TISSUES: Normal       OTHER: None                   ICU Comprehensive Plan of Care:     Plans for this Shift:     1. Admit to ICU  2. Consult to neurology and neurosurgery  3. q1h neurological checks  4.  Cardene is needed to maintain SBP< 160mmHg  5. Repeat CT head today, obtain MRI as well  6. PT/OT/SLP  7. Smoking and drug cessation counseling  8. SBP Goal of: < 160 mmHg  9. MAP Goal of: 65-85 mmHg  10. Nicardipine (Cardene) - For above SBP/MAP goals  11. IVFs:   12. Transfusion Trigger (Hgb): <7 g/dL  13. Respiratory Goals:  a. N/A  14. Pulmonary toilet: Incentive Spirometry   15. SpO2 Goal: > 92%  16. Keep K>4; Mg>2   17. PT/OT: PT consulted and on board, OT consulted and on board and Speech therapy consulted and on board   18. Goals of Care Discussion with family Yes   23. Plan of Care/Code Status: Full Code  20. Appreciate Consultants Input   21. Discussed Care Plan with Bedside RN  22. Documentation of Current Medications  23. Rest of Plan Below:    F - Feeding:  Pending   A - Analgesia: Fentanyl and Acetaminophen  S - Sedation: N/A  T - DVT Prophylaxis: SCD's or Sequential Compression Device   H - Head of Bed: > 30 Degrees  U - Ulcer Prophylaxis: Not at this time   G - Glycemic Control: Insulin  S - Spontaneous Breathing Trial: N/A  B - Bowel Regimen: MiraLax  I - Indwelling Catheter:   Tubes: None  Lines: Peripheral IV  Drains: None  D - De-escalation of Antibiotics: None at this time    Subjective:   Progress Note: 4/16/2021      Reason for ICU Admission: Pontine hemorrhage       HPI:Alesha Kent is a 61 y.o. female with a past history of DMII, CVA with no residual effects, HTN, hyperlipidemia, recent ear infection and smoking. States that Wednesday night while she was watching television she began having numbness of her nose and area around her mouth. She started having tingling in her fingers bilaterally. Generalized weakness gradually ensued and she could not stand. She also began having blurred and double vision that has been going on for the last 24 hours. Patient had a stroke in the past so she decided she to get evaluated in the ER. Patient had an elevated BP on arrival of 190/83.  She was taken [facility] which showed acute andressa hemorrhage 1 x 2 x 1.5 cm. No hydrocephalus. Dr. Armand Harkins with [facility] was consulted. No neurosurgical intervention is indicated at this time. She was transferred to [facility] ER for a higher level of care where she is awaiting an ICU bed. POD:  * No surgery found *    S/P:       Active Problem List:     Problem List  Date Reviewed: 3/11/2021          Codes Class    ICH (intracerebral hemorrhage) ([facility] ICD-10-CM: I61.9  ICD-9-CM: 471         Type 2 diabetes mellitus with hyperglycemia, with long-term current use of insulin (HCC) ICD-10-CM: E11.65, Z79.4  ICD-9-CM: 250.00, 790.29, V58.67         Noncompliance with medication regimen ICD-10-CM: Z91.14  ICD-9-CM: V15.81         Type II diabetes mellitus, uncontrolled ([facility] ICD-10-CM: E11.65  ICD-9-CM: 250.02         Hyperlipidemia ICD-10-CM: E78.5  ICD-9-CM: 272.4         Hypertensive disorder ICD-10-CM: I10  ICD-9-CM: 401.9         History of recent dental procedure ICD-10-CM: Z98.890  ICD-9-CM: V15.29               Past Medical History:      has a past medical history of History of recent dental procedure (2020), Hyperlipidemia (2020), Hypertensive disorder (2020), Murmur, Stroke ([facility] and Type II diabetes mellitus, uncontrolled ([facility] (2020). Past Surgical History:      has a past surgical history that includes hx  section; hx orthopaedic; and hx appendectomy. Home Medications:     Prior to Admission medications    Medication Sig Start Date End Date Taking?  Authorizing Provider   ofloxacin (FLOXIN) 0.3 % ophthalmic solution instill 10 DROPS into THE affected ear ONCE A DAY FOR 7 DAYS 21   Provider, Historical   insulin glargine (Lantus Solostar U-100 Insulin) 100 unit/mL (3 mL) inpn 30 units every morning 3/11/21   Jes Cardona MD   Insulin Needles, Disposable, (BD Ultra-Fine Short Pen Needle) 31 gauge x 5/16\" ndle Once a day 3/11/21   Jes Cardona MD   glucose blood VI test strips (True Metrix Glucose Test Strip) strip by Does Not Apply route See Admin Instructions. Twice a day 3/11/21   Kendy Diana MD   lancets (Ultra-Care Lancets) 30 gauge misc Twice a day 3/11/21   Kendy Diana MD   acetaminophen (TYLENOL) 500 mg tablet Take  by mouth every six (6) hours as needed for Pain. Provider, Historical   alcohol swabs (Alcohol Pads) padm by Apply Externally route. Provider, Historical   amLODIPine (NORVASC) 5 mg tablet Take 5 mg by mouth daily. Provider, Historical   aspirin delayed-release 81 mg tablet Take  by mouth daily. Provider, Historical   cyanocobalamin (VITAMIN B-12) 1,000 mcg sublingual tablet Take 1,000 mcg by mouth daily. Provider, Historical   irbesartan (AVAPRO) 150 mg tablet Take 150 mg by mouth nightly. Provider, Historical   labetaloL (NORMODYNE) 100 mg tablet Take  by mouth two (2) times a day. Provider, Historical   Lancing Device misc by Does Not Apply route. Provider, Historical   latanoprost (XALATAN) 0.005 % ophthalmic solution Administer 1 Drop to both eyes nightly. Provider, Historical   meclizine (ANTIVERT) 25 mg tablet Take  by mouth three (3) times daily as needed for Dizziness. Provider, Historical   omeprazole (PRILOSEC) 20 mg capsule Take 20 mg by mouth daily. Provider, Historical       Allergies/Social/Family History: Allergies   Allergen Reactions    Penicillins Other (comments)     Yeast Infection    Percocet [Oxycodone-Acetaminophen] Hives      Social History     Tobacco Use    Smoking status: Current Every Day Smoker     Packs/day: 0.50    Smokeless tobacco: Never Used   Substance Use Topics    Alcohol use: Yes     Comment: daily      Family History   Problem Relation Age of Onset    Diabetes Mother     Diabetes Paternal Grandmother        Review of Systems:     A comprehensive review of systems was negative except for that written in the HPI.     Objective:   Vital Signs:  Visit Vitals  BP (!) 141/59   Pulse 73   Temp 98.7 °F (37.1 °C)   Resp 20   SpO2 100% Temp (24hrs), Av.4 °F (36.9 °C), Min:98 °F (36.7 °C), Max:98.7 °F (37.1 °C)           Intake/Output:   No intake or output data in the 24 hours ending 21 0539    Physical Exam:    General:  Alert, cooperative, well noursished, well developed, appears stated age   Eyes:  Sclera anicteric. Pupils equally round and reactive to light. Mouth/Throat: Mucous membranes normal, oral pharynx clear   Neck: Supple   Lungs:   Clear to auscultation bilaterally, good effort   CV:  Regular rate and rhythm,no murmur, click, rub or gallop   Abdomen:   Soft, non-tender. bowel sounds normal. non-distended   Extremities: No cyanosis or edema   Skin: Skin color, texture, turgor normal. no acute rash or lesions   Lymph nodes: Cervical and supraclavicular normal   Musculoskeletal: No swelling or deformity   Lines/Devices:  Intact, no erythema, drainage or tenderness   Psych: Alert and oriented, normal mood affect given the setting       LABS AND  DATA: Personally reviewed  Recent Labs     21  014   WBC 6.0   HGB 13.9   HCT 42.2   *     Recent Labs     21      K 4.1      CO2 26   BUN 12   CREA 1.22*   *   CA 8.7     Recent Labs     21   *   TP 7.0   ALB 2.8*   GLOB 4.2*     Recent Labs     21  0204   INR 0.9   PTP 12.5      No results for input(s): PHI, PCO2I, PO2I, FIO2I in the last 72 hours. Recent Labs     21  014   TROIQ <0.05       Hemodynamics:   PAP:   CO:     Wedge:   CI:     CVP:    SVR:       PVR:       Ventilator Settings:  Mode Rate Tidal Volume Pressure FiO2 PEEP                    Peak airway pressure:      Minute ventilation:          MEDS: Reviewed    Chest X-Ray:  CXR Results  (Last 48 hours)               21 005  XR CHEST PORT Final result    Impression:      Negative. Narrative: Indication: Fatigue. AP semiupright portable chest radiograph 0050 hours 2021. Comparison . Clear lungs.    Normal heart size.   No pleural effusion or pneumothorax. Thoracic spondylosis. ECHO:      Multidisciplinary Rounds Completed:  Pending    ABCDEF Bundle/Checklist Completed:  Yes    SPECIAL EQUIPMENT  None    DISPOSITION  Stay in ICU    CRITICAL CARE CONSULTANT NOTE  I had a face to face encounter with the patient, reviewed and interpreted patient data including clinical events, labs, images, vital signs, I/O's, and examined patient. I have discussed the case and the plan and management of the patient's care with the consulting services, the bedside nurses and the respiratory therapist.      NOTE OF PERSONAL INVOLVEMENT IN CARE   This patient has a high probability of imminent, clinically significant deterioration, which requires the highest level of preparedness to intervene urgently. I participated in the decision-making and personally managed or directed the management of the following life and organ supporting interventions that required my frequent assessment to treat or prevent imminent deterioration. I personally spent 60 minutes of critical care time. This is time spent at this critically ill patient's bedside actively involved in patient care as well as the coordination of care and discussions with the patient's family. This does not include any procedural time which has been billed separately.       Alivia Pelletier [facility]     Critical Care Medicine  [facility]

## 2021-04-16 NOTE — PROGRESS NOTES
Stroke Coordinator  [facility] Consult     1.) HPI: Patient states symptoms started Wednesday night while she was watching television. She started having numbness of her nose and around her mouth, as well as bilateral hands. She is having double vision and generalized weakness    2.) Medical hx  DMII, CVA, HTN, hyperlipidemia, recent ear infection     3.) Traumatic ICH? NO (see ICH score)                   3a. ) ICH Score:     GCS:15 (0)      ICH Volume: 1 x 2 x 1.5cm =1.5cc (0)      IVH:No (0)      Location:[place] (1)      Age: 61 (0)    ICH SCORE:1    Imaging:   CT head: Acute robert hemorrhage. Moderate patchy  white matter abnormality as described is nonspecific but could indicate sequela of chronic small vessel disease. Chronic heterogeneous and partially hyperdense opacification of the right maxillary sinus may indicate complex infection such  as fungal sinusitis. Plan:  Neurology consult placed. Admitted to the ICU for close monitoring. No hydrocephalus. No neurosurgical intervention appropriate at this time. Discussed with:   Baldemar Franco NP     NIHSS:      1a-LOC:0    1b-Month/Age:0    1c-Open/Close Hand:0    2-Best Gaze:0    3-Visual Fields:0    4-Facial Palsy:0    5a-Left Arm:0    5b-Right Arm:0    6a-Left Le    6b-Right Le    7-Limb Ataxia:0    8-Sensory:1    9-Best Language:0    10-Dysarthria:1    11-Extinction/Inattention:0  TOTAL SCORE:2  Time spent: 30 minutes.      Roxann Lozoya NP  Neurocritical Care Nurse Practitioner  [phone]

## 2021-04-16 NOTE — CONSULTS
Pt with 3 day history of symptoms. H/o htn and previous strokes. Ct with pontine hemorrhage, no hydrocephalus. NO neurosurgical intervention appropriate at this time.   Recommend neurology and medicine consult and bp control to sbp<160

## 2021-04-16 NOTE — PROGRESS NOTES
Brief note, full report to follow. Swallowing evaluation completed. Recommend regular diet. Ibrahima Inman M.CD.  CCC-SLP

## 2021-04-16 NOTE — PROGRESS NOTES
TRANSITIONS OF CARE PLAN:   1. RUR: 10%  2. DESTINATION: TBD  3. TRANSPORT: Daughter  4. NEEDS FOR DISCHARGE: Additional Resources for substance abuse  5. ANTICIPATED FOLLOW UPS: PCP, Neuro, OP Substance Abuse Support  6. ONGOING INPATIENT NEEDS: Q1 Neuro Checks, Neuro Consult, Repeat Head CT, PT/OT/SLP Evals, Brain MRI, Echo    Anticipated Discharge is: 24-48 Hours    Reason for Admission:  [address]                RUR Score:  10%                   Plan for utilizing home health:      TBD    PCP: First and Last name:  Landen Pop NP     Name of Practice: [facility]   Are you a current patient: Yes/No: Yes   Approximate date of last visit: 1 month ago   Can you participate in a virtual visit with your PCP: yes                    Current Advanced Directive/Advance Care Plan: Full Code      Healthcare Decision Maker:   Click here to complete [address] including selection of the [address] Relationship (ie \"Primary\")           4 children: Mya Matthews, John [place], and [employer]                  Transition of Care Plan:    CM notes CM Consult for Assistance with Discharge Planning. Patient was transferred from [facility] 4/15 for Pontine Hemorrhage. Patient lives alone in a first floor apartment, no exterior steps. Patient is independent in ADLs; patient's family transports patient, or she uses public transportation. Home DME includes: glucometer. Pharmacy preference is [facility] in [facility]. Patient has 4 adult children, whom all live in the immediate area: [facility], Gladys Samuel, John [place], and [employer]. Patient does not have an AMD and declined completion but agreed to completion of ACP. Patient has no hx of HH or Rehab. Patient identified that she has not worked since 2014 due to [facility] of Stroke, elevated BP, and not well controlled DM.   CM discussed that hospital staff would not be able to assist with application process for disability, but patient's Medicaid SW should have further information re: disability application process. Patient identified that she has used marijuana, drank, and cocaine since age 5. Her longest period of sobriety was 1.5 years. Patient has no hx of addiction rehab services. Patient would be open to information re: OP sobriety support services. CM discussed that OP addiction rehab services are provided by local [facility] via rapid access programming. CM placed information on AVS.                      Care Management Interventions  PCP Verified by CM: Yes(last seen by NP Nikolas Gannon 1 month ago)  Palliative Care Criteria Met (RRAT>21 & CHF Dx)?: No  Mode of Transport at Discharge:  Other (see comment)(daughter)  Transition of Care Consult (CM Consult): Discharge Planning  MyChart Signup: No  Discharge Durable Medical Equipment: No(has glucometer)  Health Maintenance Reviewed: Yes(cm met with patient, with patient alert and oriented x4)  Physical Therapy Consult: Yes  Occupational Therapy Consult: Yes  Speech Therapy Consult: Yes  Current Support Network: Own Home, Lives Alone, Family Lives Nearby  Confirm Follow Up Transport: Family(independent in ADLs, family transports)  [address] Provided?: No  Discharge Location  Discharge Placement: Unable to determine at this time(lives alone in a first floor apartment, no steps)    CRM: Reyes Ingles, MPH, [address] [place] [address]; Z: [phone]
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
## 2021-04-16 NOTE — ED PROVIDER NOTES
EMERGENCY DEPARTMENT HISTORY AND PHYSICAL EXAM      Date: 4/16/2021  Patient Name: Butch Morton    History of Presenting Illness     Chief Complaint   Patient presents with    Fatigue       History Provided By: Patient and Patient's Daughter    HPI: April R Jeimy Griggs, 61 y.o. female with a past medical history significant diabetes, hypertension and stroke presents to the ED with cc of weakness, numbness tingling to hands and face. Patient states approximately 2 days ago noticed some numbness tingling to her face, numbness tingling to bilateral hands and generalized weakness, denies any focal extremity weakness states that she was just having trouble getting up out of chairs walking around. As patient has a history of CVA no residual deficits, decided to come to emergency department for evaluation. Patient denies any headaches nausea vomiting is complaining of some blurred and double vision over the last day. Denies any chest pains palpitations or shortness of breath. There are no other complaints, changes, or physical findings at this time. PCP: Manjula Mckeon NP    Current Outpatient Medications   Medication Sig Dispense Refill    ofloxacin (FLOXIN) 0.3 % ophthalmic solution instill 10 DROPS into THE affected ear ONCE A DAY FOR 7 DAYS      ibuprofen (MOTRIN) 800 mg tablet TAKE ONE TABLET BY MOUTH THREE TIMES DAILY AS NEEDED WITH FOOD OR MILK      insulin glargine (Lantus Solostar U-100 Insulin) 100 unit/mL (3 mL) inpn 30 units every morning 3 Adjustable Dose Pre-filled Pen Syringe 3    Insulin Needles, Disposable, (BD Ultra-Fine Short Pen Needle) 31 gauge x 5/16\" ndle Once a day 1 Package 3    glucose blood VI test strips (True Metrix Glucose Test Strip) strip by Does Not Apply route See Admin Instructions.  Twice a day 100 Strip 3    lancets (Ultra-Care Lancets) 30 gauge misc Twice a day 100 Lancet 3    acetaminophen (TYLENOL) 500 mg tablet Take  by mouth every six (6) hours as needed for Pain.  alcohol swabs (Alcohol Pads) padm by Apply Externally route.  amLODIPine (NORVASC) 5 mg tablet Take 5 mg by mouth daily.  aspirin delayed-release 81 mg tablet Take  by mouth daily.  cyanocobalamin (VITAMIN B-12) 1,000 mcg sublingual tablet Take 1,000 mcg by mouth daily.  irbesartan (AVAPRO) 150 mg tablet Take 150 mg by mouth nightly.  labetaloL (NORMODYNE) 100 mg tablet Take  by mouth two (2) times a day.  Lancing Device misc by Does Not Apply route.  latanoprost (XALATAN) 0.005 % ophthalmic solution Administer 1 Drop to both eyes nightly.  meclizine (ANTIVERT) 25 mg tablet Take  by mouth three (3) times daily as needed for Dizziness.  omeprazole (PRILOSEC) 20 mg capsule Take 20 mg by mouth daily. Past History     Past Medical History:  Past Medical History:   Diagnosis Date    History of recent dental procedure 2020    Hyperlipidemia 2020    Hypertensive disorder 2020    Murmur     Stroke [facility])     Type II diabetes mellitus, uncontrolled ([address] 2020       Past Surgical History:  Past Surgical History:   Procedure Laterality Date    HX APPENDECTOMY      HX  SECTION      HX ORTHOPAEDIC         Family History:  Family History   Problem Relation Age of Onset    Diabetes Mother     Diabetes Paternal Grandmother        Social History:  Social History     Tobacco Use    Smoking status: Current Every Day Smoker     Packs/day: 0.50    Smokeless tobacco: Never Used   Substance Use Topics    Alcohol use: Yes     Comment: daily    Drug use: Not Currently     Types: Marijuana       Allergies: Allergies   Allergen Reactions    Penicillins Other (comments)     Yeast Infection    Percocet [Oxycodone-Acetaminophen] Hives         Review of Systems     Review of Systems   Constitutional: Negative for activity change, appetite change and fever. HENT: Negative for congestion and sore throat.     Eyes: Positive for visual disturbance. Negative for photophobia. Respiratory: Negative for cough, chest tightness and shortness of breath. Cardiovascular: Negative for chest pain and palpitations. Gastrointestinal: Negative for nausea and vomiting. Musculoskeletal: Positive for neck pain. Negative for arthralgias, back pain and neck stiffness. Neurological: Positive for weakness and numbness. Negative for dizziness, syncope and headaches. Physical Exam     Physical Exam  Vitals signs and nursing note reviewed. Constitutional:       General: She is not in acute distress. Appearance: Normal appearance. She is normal weight. She is not toxic-appearing. HENT:      Head: Normocephalic and atraumatic. Nose: Nose normal.      Mouth/Throat:      Mouth: Mucous membranes are moist.      Pharynx: Oropharynx is clear. Eyes:      Extraocular Movements: Extraocular movements intact. Conjunctiva/sclera: Conjunctivae normal.      Pupils: Pupils are equal, round, and reactive to light. Neck:      Musculoskeletal: Normal range of motion and neck supple. No neck rigidity. Cardiovascular:      Rate and Rhythm: Normal rate and regular rhythm. Pulses: Normal pulses. Pulmonary:      Effort: Pulmonary effort is normal.      Breath sounds: Normal breath sounds. Abdominal:      General: Abdomen is flat. Palpations: Abdomen is soft. Musculoskeletal: Normal range of motion. General: No tenderness. Lymphadenopathy:      Cervical: No cervical adenopathy. Skin:     General: Skin is warm and dry. Capillary Refill: Capillary refill takes less than 2 seconds. Coloration: Skin is not pale. Neurological:      General: No focal deficit present. Mental Status: She is alert and oriented to person, place, and time. Mental status is at baseline. Cranial Nerves: No cranial nerve deficit. Sensory: No sensory deficit. Motor: No weakness.       Coordination: Coordination normal. Psychiatric:         Mood and Affect: Mood normal.         Behavior: Behavior normal.         Diagnostic Study Results     Labs -     Recent Results (from the past 12 hour(s))   GLUCOSE, POC    Collection Time: 04/16/21 12:22 AM   Result Value Ref Range    Glucose (POC) 351 (H) 65 - 100 mg/dL    Performed by Mika Pall    CBC WITH AUTOMATED DIFF    Collection Time: 04/16/21  1:47 AM   Result Value Ref Range    WBC 6.0 3.6 - 11.0 K/uL    RBC 4.90 3.80 - 5.20 M/uL    HGB 13.9 11.5 - 16.0 g/dL    HCT 42.2 35.0 - 47.0 %    MCV 86.1 80.0 - 99.0 FL    MCH 28.4 26.0 - 34.0 PG    MCHC 32.9 30.0 - 36.5 g/dL    RDW 15.2 (H) 11.5 - 14.5 %    PLATELET 573 (H) 647 - 400 K/uL    MPV 10.3 8.9 - 12.9 FL    NRBC 0.0 0  WBC    ABSOLUTE NRBC 0.00 0.00 - 0.01 K/uL    NEUTROPHILS 43 32 - 75 %    LYMPHOCYTES 42 12 - 49 %    MONOCYTES 11 5 - 13 %    EOSINOPHILS 2 0 - 7 %    BASOPHILS 1 0 - 1 %    IMMATURE GRANULOCYTES 1 (H) 0.0 - 0.5 %    ABS. NEUTROPHILS 2.6 1.8 - 8.0 K/UL    ABS. LYMPHOCYTES 2.6 0.8 - 3.5 K/UL    ABS. MONOCYTES 0.6 0.0 - 1.0 K/UL    ABS. EOSINOPHILS 0.1 0.0 - 0.4 K/UL    ABS. BASOPHILS 0.0 0.0 - 0.1 K/UL    ABS. IMM. GRANS. 0.0 0.00 - 0.04 K/UL    DF AUTOMATED     METABOLIC PANEL, COMPREHENSIVE    Collection Time: 04/16/21  1:47 AM   Result Value Ref Range    Sodium 136 136 - 145 mmol/L    Potassium 4.1 3.5 - 5.1 mmol/L    Chloride 105 97 - 108 mmol/L    CO2 26 21 - 32 mmol/L    Anion gap 5 5 - 15 mmol/L    Glucose 351 (H) 65 - 100 mg/dL    BUN 12 6 - 20 mg/dL    Creatinine 1.22 (H) 0.55 - 1.02 mg/dL    BUN/Creatinine ratio 10 (L) 12 - 20      GFR est AA 55 (L) >60 ml/min/1.73m2    GFR est non-AA 45 (L) >60 ml/min/1.73m2    Calcium 8.7 8.5 - 10.1 mg/dL    Bilirubin, total 0.2 0.2 - 1.0 mg/dL    AST (SGOT) 15 15 - 37 U/L    ALT (SGPT) 30 12 - 78 U/L    Alk.  phosphatase 129 (H) 45 - 117 U/L    Protein, total 7.0 6.4 - 8.2 g/dL    Albumin 2.8 (L) 3.5 - 5.0 g/dL    Globulin 4.2 (H) 2.0 - 4.0 g/dL    A-G Ratio 0.7 (L) 1.1 - 2.2     TROPONIN I    Collection Time: 04/16/21  1:47 AM   Result Value Ref Range    Troponin-I, Qt. <0.05 <0.05 ng/mL   PROTHROMBIN TIME + INR    Collection Time: 04/16/21  2:04 AM   Result Value Ref Range    Prothrombin time 12.5 11.9 - 14.7 sec    INR 0.9 0.9 - 1.1         Radiologic Studies -   [unfilled]  CT Results  (Last 48 hours)               04/16/21 0137  CT HEAD WO CONT Final result    Impression:  Findings compatible with acute andressa hemorrhage. Moderate patchy   white matter abnormality as described is nonspecific but could indicate sequela   of chronic small vessel disease. Chronic heterogeneous and partially hyperdense   opacification of the right maxillary sinus may indicate complex infection such   as fungal sinusitis. Results called to Denzil Romberg on 4/16/2021 1:57 AM.       Narrative:  HISTORY:  facial numbness, r/o cva   Dose reduction technique: All CT scans at this facility are performed using dose reduction optimization   technique as appropriate on the exam including the following: Automated exposure   control, adjustment of the MA and/or KV according to patient size and/or use of   iterative reconstructive technique. TECHNIQUE: [Without contrast]   COMPARISON: [None]   LIMITATIONS: [None]       BRAIN: Moderate patchy areas of subcortical and periventricular white matter   hypodensity. Irregular shaped focus of hyperdensity in the andressa measures up to   1.0 x 2.0 x 1.5 cm. VENTRICLES: No hydrocephalus. EXTRA-AXIAL SPACES/SULCI:  No extra-axial hemorrhage, fluid collection or mass. CALVARIUM/SKULL BASE: Normal       FACE/SINUSES: Heterogeneous partially hypodense and partially calcified   opacification of the right maxillary sinus. SOFT TISSUES: Normal       OTHER: None               CXR Results  (Last 48 hours)               04/16/21 0055  XR CHEST PORT Final result    Impression:      Negative. Narrative: Indication: Fatigue. AP semiupright portable chest radiograph 0050 hours 4/16/2021. Comparison 2016. Clear lungs. Normal heart size. No pleural effusion or pneumothorax. Thoracic spondylosis. Medical Decision Making and ED Course   I am the first provider for this patient. I reviewed the vital signs, available nursing notes, past medical history, past surgical history, family history and social history. Vital Signs-Reviewed the patient's vital signs. Patient Vitals for the past 12 hrs:   Temp Pulse Resp BP SpO2   04/16/21 0317  74 20 (!) 151/70 100 %   04/16/21 0310  76  (!) 169/90    04/16/21 0303  81 18 (!) 169/90 100 %   04/16/21 0234  71 18 (!) 184/72 100 %   04/16/21 0226     100 %   04/16/21 0224  62  (!) 190/83    04/16/21 0219  (!) 59 18 (!) 190/83 100 %   04/16/21 0028 98 °F (36.7 °C) 64 16 (!) 161/96 96 %       EKG interpretation: (Preliminary)  Normal sinus rhythm 65, no ST elevations, LVH, T wave inversions in lateral leads. Records Reviewed: Nursing Notes    The patient presents with numbness tingling in face, hands, generalized weakness with a differential diagnosis of CVA, hypoglycemia, electrolyte abnormality, intracranial hemorrhage      Provider Notes (Medical Decision Making):     MDM   39-year-old female, past medical history of diabetes hypertension CVA, presents emergency department for 2 days of facial numbness and numbness, generalized weakness. Physical exam shows well-appearing female, no distress, stable vitals mild hypertension blood pressure 161/96, neurological exam shows no focal neurological deficits,    We will draw basic lab work, head CT, continue to monitor. ED Course:   Initial assessment performed. The patients presenting problems have been discussed, and they are in agreement with the care plan formulated and outlined with them. I have encouraged them to ask questions as they arise throughout their visit.     ED Course as of Apr 16 7925   Fri Apr 16, 2021   0215 Patient's head CT resulted by radiology, concerning for acute andressa bleed. I informed patient and family regarding results, patient again denies any headache no focal neurological deficits noted. Discussed necessity for transfer to higher level of care, patient amenable to transfer. Neurosurgery paged through [facility] core transfer axis line, anticipate transfer to [facility].    [PZ]   0221 BP BP noted to be 190/83, pulse 59, hydralazine 20 mg IV ordered    [PZ]   0239 Discussed case with Dr. Les Pillai, neurosurgeon at [facility], patient does not require any acute neurosurgical intervention, recommends admission to ICU with neurology evaluation, blood pressure control, systolic should be below 793. [PZ]   [phone] Discussed case with ED attending [facility], directed me to ICU NP Irene Elias, presented case, accepted for admission under Beto Haywood. Will transfer patient to [facility] ED to board, as there currently are no ICU beds available. [PZ]      ED Course User Index  [PZ] Indy Michele MD         Procedures       Siobhan Morrissey MD  Procedures                 Disposition       Transferred to Another Facility        Diagnosis     Clinical Impression:   1. Acute intracranial hemorrhage (HCC)        Attestations:    Siobhan Morrissey MD    Please note that this dictation was completed with [employer], the computer voice recognition software. Quite often unanticipated grammatical, syntax, homophones, and other interpretive errors are inadvertently transcribed by the computer software. Please disregard these errors. Please excuse any errors that have escaped final proofreading. Thank you.

## 2021-04-16 NOTE — ED TRIAGE NOTES
Pt arrived from [facility] for a hemorrhagic stroke and HTN. Per EMs she has a hx of HTN, did not fall, and is not on blood thinners.

## 2021-04-16 NOTE — PROGRESS NOTES
Problem: Mobility Impaired (Adult and Pediatric)  Goal: *Acute Goals and Plan of Care (Insert Text)  Description: FUNCTIONAL STATUS PRIOR TO ADMISSION: Patient was independent and active without use of DME.    HOME SUPPORT PRIOR TO ADMISSION: The patient lived alone, did not require assist though daughter is available to assist when needed. Physical Therapy Goals  Initiated 4/16/2021  1. Patient will move from supine to sit and sit to supine , scoot up and down, and roll side to side in bed with modified independence within 7 day(s). 2.  Patient will transfer from bed to chair and chair to bed with modified independence using the least restrictive device within 7 day(s). 3.  Patient will perform sit to stand with modified independence within 7 day(s). 4.  Patient will ambulate with independence for 300 feet with the least restrictive device within 7 day(s). 5.  Patient will ascend/descend 3 stairs with handrail(s) with modified independence within 7 day(s). Outcome: Not Met   PHYSICAL THERAPY EVALUATION  Patient: April R Scott Coleman (61 y.o. female)  Date: 4/16/2021  Primary Diagnosis: [facility] [I61.9]        Precautions:  Fall    ASSESSMENT  Based on the objective data described below, the patient presents with impaired functional mobility compared to baseline s/p admission w/ ICH. CT shows acute andressa hemorrhage. Transport arrived during this session to take patient to MRI. Currently functional mobility is limited by c/o light headedness, mildly impaired stand balance, generalized weakness though grossly functional with LE's and  equal, decreased tolerance to activity, and HTN with upright. Functionally, patient required min assist supine<->sit, up to min assist sit<->stand, able to take a few steps from bed<->BSC with min assist.  Unable to assess ambulation this session due to patient being taken to MRI. Will address next session(s). Therapy will follow.       Current Level of Function Impacting Discharge (mobility/balance): Up to min assist    Functional Outcome Measure: The patient scored 45/100 on the Barthel outcome measure which is indicative of 55% impairment in functional mobility and ADLs. Other factors to consider for discharge: Lives alone, supportive family available to assist.      Patient will benefit from skilled therapy intervention to address the above noted impairments. PLAN :  Recommendations and Planned Interventions: bed mobility training, transfer training, gait training, therapeutic exercises, neuromuscular re-education, patient and family training/education and therapeutic activities      Frequency/Duration: Patient will be followed by physical therapy:  5 times a week to address goals. Recommendation for discharge: (in order for the patient to meet his/her long term goals)  To be determined: pending gait assessment and progress    This discharge recommendation:  Has not yet been discussed the attending provider and/or case management    IF patient discharges home will need the following DME: none anticipated         SUBJECTIVE:   Patient stated I'm don't feel good.     OBJECTIVE DATA SUMMARY:   HISTORY:    Past Medical History:   Diagnosis Date    History of recent dental procedure 2020    Hyperlipidemia 2020    Hypertensive disorder 2020    Murmur     Stroke [facility])     Type II diabetes mellitus, uncontrolled ([facility] [address] 2020     Past Surgical History:   Procedure Laterality Date    HX APPENDECTOMY      HX  SECTION      HX ORTHOPAEDIC         Personal factors and/or comorbidities impacting plan of care: PMH (repts h/o vertigo)    Home Situation  Home Environment: Apartment  # Steps to Enter:  3(or elevator)  One/Two Story Residence: One story  Living Alone: Yes  Support Systems: Child(drew)  Patient Expects to be Discharged to[de-identified] Unknown  Current DME Used/Available at Home: Shower chair, Grab bars  Tub or [employer] Type: Shower    EXAMINATION/PRESENTATION/DECISION MAKING:   Critical Behavior:  Neurologic State: Alert  Orientation Level: Oriented X4  Cognition: Appropriate decision making     Hearing:  intact  Skin:  Areas exposed intact  Edema: none noted  Range Of Motion:  AROM: Grossly functional LE's. Strength:    Strength: Generally decreased, functional(RLE=LLE,  strong & =)                    Tone & Sensation:   Tone: Normal              Sensation: Intact(and smmetrical BLE to light touch)               Coordination:  Unable to assess (transport arrived). Appears functional based on observation during toileting and transfer  Vision:   Pt voiced double vision. Able to correctly detect number fingers held up  Wears glasses (progressives)  Able to track in all planes  Functional Mobility:  Bed Mobility:     Supine to Sit: Minimum assistance  Sit to Supine: Minimum assistance  Scooting: Stand-by assistance  Transfers:  Sit to Stand: Minimum assistance  Stand to Sit: Contact guard assistance        Bed to Chair [facility]): Minimum assistance;Assist x1  VC for hand placement to control descent        Balance:   Sitting: Intact; Without support  Standing: Impaired; Without support  Standing - Static: Fair;Occasional  Standing - Dynamic : Fair;Occasional  Ambulation/Gait Training:              Gait Description (WDL): Exceptions to WDL(unable to assess d/t transport arrived for MRI)  Able to take a few steps from bed<->bsc with min assist w/ slow nina, WBOS  Gait assessment limited by the arrival of transport to take pt to MRI                Functional Measure:  Barthel Index:    Bathin  Bladder: 10  Bowels: 10  Groomin  Dressin  Feedin  Mobility: 0  Stairs: 5  Toilet Use: 5  Transfer (Bed to Chair and Back): 10  Total: 45/100       The Barthel ADL Index: Guidelines  1. The index should be used as a record of what a patient does, not as a record of what a patient could do.   2. The main aim is to establish degree of independence from any help, physical or verbal, however minor and for whatever reason. 3. The need for supervision renders the patient not independent. 4. A patient's performance should be established using the best available evidence. Asking the patient, friends/relatives and nurses are the usual sources, but direct observation and common sense are also important. However direct testing is not needed. 5. Usually the patient's performance over the preceding 24-48 hours is important, but occasionally longer periods will be relevant. 6. Middle categories imply that the patient supplies over 50 per cent of the effort. 7. Use of aids to be independent is allowed. Kalia Ibarra., Barthel, D.W. (3238). Functional evaluation: the Barthel Index. [address] [facility] (14)2. JEB Ruvalcaba, Shilpa Montejo., Arlena Cushing., Nessa, [address] [facility] (1999). Measuring the change indisability after inpatient rehabilitation; comparison of the responsiveness of the Barthel Index and Functional [place] Measure. Journal of Neurology, Neurosurgery, and Psychiatry, 66(4), 423-124. Jeronimo Price, N.J.A, BETZY Ponce, & Perfecto Cancino M.A. (2004.) Assessment of post-stroke quality of life in cost-effectiveness studies: The usefulness of the Barthel Index and the EuroQoL-5D. Quality of Life Research, 15, 293-59            Physical Therapy Evaluation Charge Determination   History Examination Presentation Decision-Making   MEDIUM  Complexity : 1-2 comorbidities / personal factors will impact the outcome/ POC  MEDIUM Complexity : 3 Standardized tests and measures addressing body structure, function, activity limitation and / or participation in recreation  MEDIUM Complexity : Evolving with changing characteristics  MEDIUM Complexity : FOTO score of 26-74      Based on the above components, the patient evaluation is determined to be of the following complexity level: MEDIUM    Pain Rating:  None voiced.  At this time, cc is nausea (RN provided meds)    Activity Tolerance:   Fair and light headed, nauseas, HTN post transfer  RN aware of BP and with pt. Vitals:    04/16/21 1031 04/16/21 1035 04/16/21 1043   BP: (!) 141/71 (!) 180/82 (!) 161/75   BP 1 Location: Left upper arm Left upper arm Left upper arm   BP Patient Position: Supine Sitting  Comment: BSC Supine  Comment: after BSC to bed transfer   Pulse: 70 79    Resp: 22 24    SpO2: 100% 100%        After treatment patient left in no apparent distress:   Supine in bed and RN and transport at bedside to take pt to MRI    COMMUNICATION/EDUCATION:   The patients plan of care was discussed with: Registered nurse. Fall prevention education was provided and the patient/caregiver indicated understanding., Patient/family have participated as able in goal setting and plan of care. , and Patient/family agree to work toward stated goals and plan of care.     Thank you for this referral.  Pasha Carson, PT   Time Calculation: 28 mins

## 2021-04-16 NOTE — ACP (ADVANCE CARE PLANNING)
Advance Care Planning     Advance Care Planning Activator (Inpatient)  Conversation Note      Date of ACP Conversation: 04/16/21     Conversation Conducted with:   Patient with Decision Making Capacity    ACP Activator: Kristel Schmid Decision Maker:    Current Designated Health Care Decision Maker:     Primary Decision Maker: Alejo Donahue - Child - [phone]    Primary Decision Maker: Kye Ayala - Daughter - [phone]    Primary Decision Maker: Marina Campos - Son - [phone]    Primary Decision Maker: Rosendo Buck - Daughter - [phone]  Click here to complete [address] including selection of the Healthcare Decision Maker Relationship (ie \"Primary\")  Today we documented Decision Maker(s) consistent with Legal Next of Kin hierarchy. Care Preferences    Ventilation: \"If you were in your present state of health and suddenly became very ill and were unable to breathe on your own, what would your preference be about the use of a ventilator (breathing machine) if it were available to you? \"      If patient would desire the use of a ventilator (breathing machine), answer \"yes\", if not \"no\":yes    \"If your health worsens and it becomes clear that your chance of recovery is unlikely, what would your preference be about the use of a ventilator (breathing machine) if it were available to you? \"     Would the patient desire the use of a ventilator (breathing machine)? NO      Resuscitation  \"CPR works best to restart the heart when there is a sudden event, like a heart attack, in someone who is otherwise healthy. Unfortunately, CPR does not typically restart the heart for people who have serious health conditions or who are very sick. \"    \"In the event your heart stopped as a result of an underlying serious health condition, would you want attempts to be made to restart your heart (answer \"yes\" for attempt to resuscitate) or would you prefer a natural death (answer \"no\" for do not attempt to resuscitate)? \" yes - limited to 4 rounds       [] Yes  [x] No   Educated Patient / Brittanie Garrido regarding differences between Advance Directives and portable DNR orders. Length of ACP Conversation in minutes:  10    Conversation Outcomes:  [x] ACP discussion completed  [] Existing advance directive reviewed with patient; no changes to patient's previously recorded wishes     [] New Advance Directive completed   [] Portable Do Not Resuscitate prepared for Provider review and signature  [] POLST/POST/MOLST/MOST prepared for Provider review and signature      Follow-up plan:    [] Schedule follow-up conversation to continue planning  [] Referred individual to Provider for additional questions/concerns   [] Advised patient/agent/surrogate to review completed ACP document and update if needed with changes in condition, patient preferences or care setting     [] This note routed to one or more involved healthcare providers              Patient is okay with IVF and IV ABX, TPN and Tube Feeds via NG Tube and PEG Tube for 3-4 months. Patient would not want a trach tube.

## 2021-04-16 NOTE — PROGRESS NOTES
Problem: Self Care Deficits Care Plan (Adult)  Goal: *Acute Goals and Plan of Care (Insert Text)  Description:   FUNCTIONAL STATUS PRIOR TO ADMISSION: Patient was independent and active without use of DME. Pad for urinary incontinence when coughs. Buggy for clothing to daughter's home, 2 bus stops away. When experiencing vertigo and therefore double vision, covers one eye with patch, crawls on floor at home; daughter brings meds and groceries. Utilized bus system for transportation. Was told by physician to exercise, she does not like to exercise, so has been walking daily. She has lost 20 lbs doing so. From prior CVAs she understands the importance and is familiar with neuro plasticity, the benefits of mobility and maintaining independence. HOME SUPPORT: The patient lived alone with daughter to provide assistance PRN. Occupational Therapy Goals  Initiated 4/16/2021  1. Patient will perform bathing with minimal assistance/contact guard assist within 7 day(s). 2.  Patient will perform lower body dressing with minimal assistance/contact guard assist within 7 day(s). 3.  Patient will perform standing ADLs 5 mins with minimal assistance/contact guard assist within 7 day(s). 4.  Patient will perform toilet transfers with minimal assistance/contact guard assist within 7 day(s). 5.  Patient will perform all aspects of toileting with minimal assistance/contact guard assist within 7 day(s). 6.  Patient will utilize energy conservation techniques during functional activities with verbal cues within 7 day(s).       Outcome: Not Met       OCCUPATIONAL THERAPY EVALUATION  Patient: Alesha Muniz (61 y.o. female)  Date: 4/16/2021  Primary Diagnosis: ICH (intracerebral hemorrhage) ([facility] [place] [facility] [I61.9]       Precautions:   Fall, SBP <160    ASSESSMENT  Based on the objective data described below, the patient presents with in bed evaluation due to systolic pressure/HTN and double vision (states baseline when vertigo is active). Current Level of Function Impacting Discharge (ADLs/self-care): moderate assistance upper body ADLs; total assistance lower body ADLs    Functional Outcome Measure: The patient scored Total A-D  Total A-D (Motor Function): 65/66 on the Fugl-Orta Assessment which is indicative of no impairment in upper extremity functional status. Other factors to consider for discharge: daughter can assist PRN     Patient will benefit from skilled therapy intervention to address the above noted impairments. PLAN :  Recommendations and Planned Interventions: self care training, functional mobility training, therapeutic exercise, balance training, visual/perceptual training, therapeutic activities, cognitive retraining, endurance activities, neuromuscular re-education, patient education, home safety training, and family training/education    Frequency/Duration: Patient will be followed by occupational therapy 5 times a week to address goals. Recommendation for discharge: (in order for the patient to meet his/her long term goals)  To be determined: may need rehab pending progression with basic and instrumental ADLs. If discharges home will need assistance with all ADLs from daughter, [facility], and DME. This discharge recommendation:  Has not yet been discussed the attending provider and/or case management    IF patient discharges home will need the following DME: bedside commode       SUBJECTIVE:   Patient stated Gumaro Singh did get up to [facility].     OBJECTIVE DATA SUMMARY:   HISTORY:   Past Medical History:   Diagnosis Date    History of recent dental procedure 2020    Hyperlipidemia 2020    Hypertensive disorder 2020    Murmur     Stroke ([facility]     Type II diabetes mellitus, uncontrolled ([facility] 2020     Past Surgical History:   Procedure Laterality Date    HX APPENDECTOMY      HX  SECTION      HX ORTHOPAEDIC       Expanded or extensive additional review of patient history:     Home Situation  Home Environment: Apartment  # Steps to Enter: 3(or elevator)  One/Two Story Residence: One story  Living Alone: Yes  Support Systems: Child(drew)  Patient Expects to be Discharged to[de-identified] Unknown  Current DME Used/Available at Home: Shower chair, Grab bars  Tub or Shower Type: Shower    Hand dominance: Right    EXAMINATION OF PERFORMANCE DEFICITS:  Cognitive/Behavioral Status:  Neurologic State: Alert  Orientation Level: Oriented X4  Cognition: Follows commands  Perception: Appears intact  Perseveration: No perseveration noted  Safety/Judgement: Not assessed    Skin: intact    Edema: intact    Hearing:       Vision/Perceptual:                 WDL otherwise     Diplopia: Yes(states has when experiencing vertigo)    Acuity: Impaired near vision; Impaired far vision    Corrective Lenses: Glasses    Range of Motion:    AROM: Within functional limits                         Strength:    Strength: Generally decreased, functional(RLE=LLE,  strong & =)                Coordination:     Fine Motor Skills-Upper: Left Intact; Right Intact    Gross Motor Skills-Upper: Left Intact; Right Intact    Tone & Sensation:    Tone: Normal  Sensation: Intact(and smmetrical BLE to light touch)                      Balance:  Sitting: Intact; Without support  Standing: Impaired; Without support  Standing - Static: Fair;Occasional  Standing - Dynamic : Fair;Occasional    Functional Mobility and Transfers for ADLs:  Bed Mobility: with PT  Supine to Sit: Minimum assistance  Sit to Supine: Contact guard assistance  Scooting: Stand-by assistance    Transfers: with PT  Sit to Stand: Minimum assistance  Stand to Sit: Contact guard assistance  Bed to Chair: Minimum assistance;Assist x1    ADL Assessment:  Feeding: Modified independent -eating Goldfish upon arrival    Oral Facial Hygiene/Grooming: Supervision setup on tray, sitting up in bed    Bathing: Maximum assistance infer    Upper Body Dressing:  Total assistance infer    Lower Body Dressing: Total assistance infer moderate assistance as patient good tailor sitting in bed, and PT evaluation    Toileting: Total assistance infer moderate assistance based on patient and nurse report                ADL Intervention and task modifications:        Patient instructed and indicated understanding the benefits of maintaining activity tolerance, functional mobility, and independence with self care tasks during acute stay  to ensure safe return home and to baseline. Encouraged patient to increase frequency and duration OOB, be out of bed for all meals, perform daily ADLs (as approved by RN/MD regarding bathing etc), and performing functional mobility to/from bathroom. Cognitive Retraining  Safety/Judgement: Not assessed    Therapeutic Exercise:     Functional Measure:  Fugl-Orta Assessment of Motor Recovery after Stroke:   Reflex Activity  Flexors/Biceps/Fingers: Can be elicited  Extensors/Triceps: Can be elicited  Reflex Subtotal: 4    Volitional Movement Within Synergies  Shoulder Retraction: Full  Shoulder Elevation: Full  Shoulder Abduction (90 degrees): Full  Shoulder External Rotation: Full  Elbow Flexion: Full  Forearm Supination: Full  Shoulder Adduction/Internal Rotation: Full  Elbow Extension: Full  Forearm Pronation: Full  Subtotal: 18    Volitional Movement Mixing Synergies  Hand to Lumbar Spine: Full  Shoulder Flexion (0-90 degrees): Full  Pronation-Supination: Full  Subtotal: 6    Volitional Movement With Little or No Synergy  Shoulder Abduction (0-90 degrees): Full  Shoulder Flexion ( degrees): Full  Pronation/Supination: Full  Subtotal : 6    Normal Reflex Activity  Biceps, Triceps, Finger Flexors:  Full  Subtotal : 2    Upper Extremity Total   Upper Extremity Total: 36    Wrist  Stability at 15 Degree Dorsiflexion: Full  Repeated Dorsiflexion/ Volar Flexion: Full  Stability at 15 Degree Dorsiflexion: Full  Repeated Dorsiflexion/ Volar Flexion: Full  Circumduction: Full  Wrist Total: 10    Hand  Mass Flexion: Full  Mass Extension: Full  Grasp A: Full  Grasp B: Full  Grasp C: Full  Grasp D: Full  Grasp E: Full  Hand Total: 14    Coordination/Speed  Tremor: None  Dysmetria: Slight  Time: <1s  Coordination/Speed Total : 5    Total A-D  Total A-D (Motor Function): 65/66     This is a reliable/valid measure of arm function after a neurological event. It has established value to characterize functional status and for measuring spontaneous and therapy-induced recovery; tests proximal and distal motor functions. Fugl-Orta Assessment - UE scores recorded between five and 30 days post neurologic event can be used to predict UE recovery at six months post neurologic event. Severe = 0-21 points   Moderately Severe = 22-33 points   Moderate = 34-47 points   Mild = 48-66 points  PATRIZIA Lentz, HANNY Black, & EVE Doan (1992). Measurement of motor recovery after stroke: Outcome assessment and sample size requirements. Stroke, 23, pp. 8731-8388.   ------------------------------------------------------------------------------------------------------------------------------------------------------------------  MCID:  Stroke:   Mariza Ambrose et al, 2001; n = 171; mean age 79 (5) years; assessed within 16 (12) days of stroke, Acute Stroke)  FMA Motor Scores from Admission to Discharge   10 point increase in FMA Upper Extremity = 1.5 change in discharge FIM   10 point increase in FMA Lower Extremity = 1.9 change in discharge FIM  MDC:   Stroke:   Jorge Mott et al, 2008, n = 14, mean age = 59.9 (14.6) years, assessed on average 14 (6.5) months post stroke, Chronic Stroke)   FMA = 5.2 points for the Upper Extremity portion of the assessment     Pain Rating:      Activity Tolerance:   requires rest breaks and HTN. Patient received 733 systolic, after 5 mins discussion /66. Aborted EOB evaluation.      After treatment patient left in no apparent distress:    Supine in bed, Call bell within reach, Caregiver / family present, and Side rails x 3    COMMUNICATION/EDUCATION:   The patients plan of care was discussed with: Physical therapist and Registered nurse. Patient was educated regarding her deficit(s) of ?, need further assessment once EOB and OOB as this relates to her diagnosis of hemorrhage. She demonstrated Good understanding as evidenced by repeat back. Patient and/or family was verbally educated on the BE FAST acronym for signs/symptoms of CVA and TIA. BE FAST was written on patient's communication board  for visual education and reinforcement. All questions answered with patient indicating good understanding. Home safety education was provided and the patient/caregiver indicated understanding., Patient/family have participated as able in goal setting and plan of care. , and Patient/family agree to work toward stated goals and plan of care. This patients plan of care is appropriate for delegation to [facility].     Thank you for this referral.  Oscar Diamond  Time Calculation: 13 mins

## 2021-04-16 NOTE — PROGRESS NOTES
TRANSFER - IN REPORT:    Verbal report received from Pamela Hernandes RN(name) on April R Lauryn Bradley  being received from ED(unit) for routine progression of care      Report consisted of patients Situation, Background, Assessment and   Recommendations(SBAR). Information from the following report(s) SBAR, Kardex, ED Summary, Intake/Output, MAR, Accordion, Recent Results, Med Rec Status, Cardiac Rhythm NSR, Alarm Parameters  and Dual Neuro Assessment was reviewed with the receiving nurse. Opportunity for questions and clarification was provided. Assessment completed upon patients arrival to unit and care assumed.

## 2021-04-16 NOTE — ED TRIAGE NOTES
Patient states she started with facial and bilateral hand numbness; hx of stroke and DM; pt also c/o generalized weakness, unable to stand; denies chest pain or SOB

## 2021-04-16 NOTE — ROUTINE PROCESS
Pt transferred to [facility]- ER, DX WENDY INTRACEREBRAL BLEED, pt alert, stable, DR Paris Estrada accepting  In the ER,

## 2021-04-16 NOTE — ED NOTES
Neuro assessment performed in triage with second nurse. Negative [place] and VAN Stroke Scale. A&Ox4. Patient with strong and equal bilateral . Patient able to lift and hold bilateral arms and legs without weakness. Patient able to follow movement with bilateral eyes.

## 2021-04-17 LAB
ANION GAP SERPL CALC-SCNC: 8 MMOL/L (ref 5–15)
BUN SERPL-MCNC: 14 MG/DL (ref 6–20)
BUN/CREAT SERPL: 14 (ref 12–20)
CALCIUM SERPL-MCNC: 8.9 MG/DL (ref 8.5–10.1)
CHLORIDE SERPL-SCNC: 103 MMOL/L (ref 97–108)
CO2 SERPL-SCNC: 26 MMOL/L (ref 21–32)
CREAT SERPL-MCNC: 0.97 MG/DL (ref 0.55–1.02)
ERYTHROCYTE [DISTWIDTH] IN BLOOD BY AUTOMATED COUNT: 15.2 % (ref 11.5–14.5)
GLUCOSE BLD STRIP.AUTO-MCNC: 218 MG/DL (ref 65–100)
GLUCOSE BLD STRIP.AUTO-MCNC: 224 MG/DL (ref 65–100)
GLUCOSE BLD STRIP.AUTO-MCNC: 272 MG/DL (ref 65–100)
GLUCOSE BLD STRIP.AUTO-MCNC: 310 MG/DL (ref 65–100)
GLUCOSE SERPL-MCNC: 251 MG/DL (ref 65–100)
HCT VFR BLD AUTO: 41.3 % (ref 35–47)
HGB BLD-MCNC: 13.2 G/DL (ref 11.5–16)
MAGNESIUM SERPL-MCNC: 1.9 MG/DL (ref 1.6–2.4)
MCH RBC QN AUTO: 27.6 PG (ref 26–34)
MCHC RBC AUTO-ENTMCNC: 32 G/DL (ref 30–36.5)
MCV RBC AUTO: 86.2 FL (ref 80–99)
NRBC # BLD: 0 K/UL (ref 0–0.01)
NRBC BLD-RTO: 0 PER 100 WBC
PLATELET # BLD AUTO: 410 K/UL (ref 150–400)
PMV BLD AUTO: 9.9 FL (ref 8.9–12.9)
POTASSIUM SERPL-SCNC: 3.6 MMOL/L (ref 3.5–5.1)
RBC # BLD AUTO: 4.79 M/UL (ref 3.8–5.2)
SERVICE CMNT-IMP: ABNORMAL
SODIUM SERPL-SCNC: 137 MMOL/L (ref 136–145)
WBC # BLD AUTO: 5.5 K/UL (ref 3.6–11)

## 2021-04-17 PROCEDURE — 80048 BASIC METABOLIC PNL TOTAL CA: CPT

## 2021-04-17 PROCEDURE — 74011636637 HC RX REV CODE- 636/637: Performed by: NURSE PRACTITIONER

## 2021-04-17 PROCEDURE — 65610000006 HC RM INTENSIVE CARE

## 2021-04-17 PROCEDURE — 85027 COMPLETE CBC AUTOMATED: CPT

## 2021-04-17 PROCEDURE — 74011250637 HC RX REV CODE- 250/637: Performed by: NURSE PRACTITIONER

## 2021-04-17 PROCEDURE — 83036 HEMOGLOBIN GLYCOSYLATED A1C: CPT

## 2021-04-17 PROCEDURE — 83735 ASSAY OF MAGNESIUM: CPT

## 2021-04-17 PROCEDURE — APPNB30 APP NON BILLABLE TIME 0-30 MINS: Performed by: NURSE PRACTITIONER

## 2021-04-17 PROCEDURE — 74011250636 HC RX REV CODE- 250/636: Performed by: NURSE PRACTITIONER

## 2021-04-17 PROCEDURE — 74011250637 HC RX REV CODE- 250/637: Performed by: STUDENT IN AN ORGANIZED HEALTH CARE EDUCATION/TRAINING PROGRAM

## 2021-04-17 PROCEDURE — 99223 1ST HOSP IP/OBS HIGH 75: CPT | Performed by: NURSE PRACTITIONER

## 2021-04-17 PROCEDURE — 36415 COLL VENOUS BLD VENIPUNCTURE: CPT

## 2021-04-17 PROCEDURE — 82962 GLUCOSE BLOOD TEST: CPT

## 2021-04-17 RX ORDER — INSULIN GLARGINE 100 [IU]/ML
30 INJECTION, SOLUTION SUBCUTANEOUS DAILY
Status: DISCONTINUED | OUTPATIENT
Start: 2021-04-17 | End: 2021-04-20 | Stop reason: HOSPADM

## 2021-04-17 RX ORDER — LOSARTAN POTASSIUM 50 MG/1
50 TABLET ORAL DAILY
Status: DISCONTINUED | OUTPATIENT
Start: 2021-04-17 | End: 2021-04-20 | Stop reason: HOSPADM

## 2021-04-17 RX ORDER — AMLODIPINE BESYLATE 5 MG/1
5 TABLET ORAL DAILY
Status: DISCONTINUED | OUTPATIENT
Start: 2021-04-17 | End: 2021-04-20

## 2021-04-17 RX ORDER — HYDRALAZINE HYDROCHLORIDE 20 MG/ML
20 INJECTION INTRAMUSCULAR; INTRAVENOUS
Status: DISCONTINUED | OUTPATIENT
Start: 2021-04-17 | End: 2021-04-19

## 2021-04-17 RX ORDER — LABETALOL 100 MG/1
100 TABLET, FILM COATED ORAL 2 TIMES DAILY
Status: DISCONTINUED | OUTPATIENT
Start: 2021-04-17 | End: 2021-04-17 | Stop reason: SDUPTHER

## 2021-04-17 RX ORDER — ASPIRIN 81 MG/1
81 TABLET ORAL DAILY
Status: DISCONTINUED | OUTPATIENT
Start: 2021-04-17 | End: 2021-04-19

## 2021-04-17 RX ORDER — MECLIZINE HCL 12.5 MG 12.5 MG/1
25 TABLET ORAL
Status: DISCONTINUED | OUTPATIENT
Start: 2021-04-17 | End: 2021-04-20 | Stop reason: HOSPADM

## 2021-04-17 RX ADMIN — PANTOPRAZOLE SODIUM 40 MG: 40 TABLET, DELAYED RELEASE ORAL at 06:58

## 2021-04-17 RX ADMIN — INSULIN GLARGINE 30 UNITS: 100 INJECTION, SOLUTION SUBCUTANEOUS at 08:56

## 2021-04-17 RX ADMIN — AMLODIPINE BESYLATE 5 MG: 5 TABLET ORAL at 08:56

## 2021-04-17 RX ADMIN — Medication 10 ML: at 14:00

## 2021-04-17 RX ADMIN — INSULIN LISPRO 7 UNITS: 100 INJECTION, SOLUTION INTRAVENOUS; SUBCUTANEOUS at 17:16

## 2021-04-17 RX ADMIN — AZITHROMYCIN MONOHYDRATE 500 MG: 500 INJECTION, POWDER, LYOPHILIZED, FOR SOLUTION INTRAVENOUS at 11:17

## 2021-04-17 RX ADMIN — INSULIN LISPRO 3 UNITS: 100 INJECTION, SOLUTION INTRAVENOUS; SUBCUTANEOUS at 11:27

## 2021-04-17 RX ADMIN — LABETALOL HYDROCHLORIDE 100 MG: 100 TABLET, FILM COATED ORAL at 00:08

## 2021-04-17 RX ADMIN — ASPIRIN 81 MG: 81 TABLET, COATED ORAL at 08:56

## 2021-04-17 RX ADMIN — INSULIN LISPRO 2 UNITS: 100 INJECTION, SOLUTION INTRAVENOUS; SUBCUTANEOUS at 23:27

## 2021-04-17 RX ADMIN — MECLIZINE 25 MG: 12.5 TABLET ORAL at 23:20

## 2021-04-17 RX ADMIN — Medication 10 ML: at 06:00

## 2021-04-17 RX ADMIN — LOSARTAN POTASSIUM 50 MG: 50 TABLET, FILM COATED ORAL at 08:56

## 2021-04-17 RX ADMIN — LABETALOL HYDROCHLORIDE 100 MG: 100 TABLET, FILM COATED ORAL at 17:22

## 2021-04-17 RX ADMIN — INSULIN LISPRO 5 UNITS: 100 INJECTION, SOLUTION INTRAVENOUS; SUBCUTANEOUS at 07:03

## 2021-04-17 RX ADMIN — ACETAMINOPHEN 650 MG: 325 TABLET, FILM COATED ORAL at 00:39

## 2021-04-17 RX ADMIN — LABETALOL HYDROCHLORIDE 100 MG: 100 TABLET, FILM COATED ORAL at 08:56

## 2021-04-17 NOTE — PROGRESS NOTES
Neurology Progress Note  Agustin Rasmussen NP    Patient: Alesha Del Angel MRN: [id number]  SSN: [id number]    YOB: 1965  Age: 61 y.o. Sex: female      Chief Complaint:Double vision, facial numbness, bilateral finger numbness, and generalized weakness. HPI: Alesha Del Angel is a 61 y.o. female with a past history of DMII, CVA with no residual effects, HTN, hyperlipidemia, recent ear infection and smoking. States that Wednesday night while she was watching television she began having numbness of her nose and area around her mouth. She started having tingling in her fingers bilaterally. Generalized weakness gradually ensued and she could not stand. She also began having blurred and double vision that has been going on for the last 24 hours. Patient had a stroke in the past so she decided she decided to get evaluated in the ER. Patient had an elevated BP on arrival of 190/83. She was taken for [facility] which showed acute andressa hemorrhage 1 x 2 x 1.5 cm. No hydrocephalus. Dr. Merlin Mainland with [facility] was consulted. No neurosurgical intervention is indicated at this time. She was transferred to [facility] ER for a higher level of care where she is awaiting an ICU bed. Patient denies blood thinning medications, however, is on baby aspirin. She denies trauma. UDS positive for cocaine. Subjective:   States the numbness and tingling in fingers and face are  Improving. Still has the double vision.       Past Medical History:   Diagnosis Date    History of recent dental procedure 7/29/2020    Hyperlipidemia 7/29/2020    Hypertensive disorder 7/29/2020    Murmur     Stroke [facility])     Type II diabetes mellitus, uncontrolled ([facility] [address] [facility] 7/29/2020     Family History   Problem Relation Age of Onset    Diabetes Mother     Diabetes Paternal Grandmother      Social History     Tobacco Use    Smoking status: Current Every Day Smoker     Packs/day: 0.50    Smokeless tobacco: Never Used   Substance Use Topics    Alcohol use: Yes     Comment: daily      Prior to Admission Medications   Prescriptions Last Dose Informant Patient Reported? Taking? Insulin Needles, Disposable, (BD Ultra-Fine Short Pen Needle) 31 gauge x 5/16\" ndle   No No   Sig: Once a day   Lancing Device misc   Yes No   Sig: by Does Not Apply route. acetaminophen (TYLENOL) 500 mg tablet   Yes No   Sig: Take  by mouth every six (6) hours as needed for Pain. alcohol swabs (Alcohol Pads) padm   Yes No   Sig: by Apply Externally route. amLODIPine (NORVASC) 5 mg tablet   Yes No   Sig: Take 5 mg by mouth daily. aspirin delayed-release 81 mg tablet   Yes No   Sig: Take  by mouth daily. cyanocobalamin (VITAMIN B-12) 1,000 mcg sublingual tablet   Yes No   Sig: Take 1,000 mcg by mouth daily. glucose blood VI test strips (True Metrix Glucose Test Strip) strip   No No   Sig: by Does Not Apply route See Admin Instructions. Twice a day   insulin glargine (Lantus Solostar U-100 Insulin) 100 unit/mL (3 mL) inpn   No No   Si units every morning   irbesartan (AVAPRO) 150 mg tablet   Yes No   Sig: Take 150 mg by mouth nightly. labetaloL (NORMODYNE) 100 mg tablet   Yes No   Sig: Take  by mouth two (2) times a day. lancets (Ultra-Care Lancets) 30 gauge misc   No No   Sig: Twice a day   latanoprost (XALATAN) 0.005 % ophthalmic solution   Yes No   Sig: Administer 1 Drop to both eyes nightly. meclizine (ANTIVERT) 25 mg tablet   Yes No   Sig: Take  by mouth three (3) times daily as needed for Dizziness. ofloxacin (FLOXIN) 0.3 % ophthalmic solution   Yes No   Sig: instill 10 DROPS into THE affected ear ONCE A DAY FOR 7 DAYS   omeprazole (PRILOSEC) 20 mg capsule   Yes No   Sig: Take 20 mg by mouth daily.       Facility-Administered Medications: None       Allergies   Allergen Reactions    Penicillins Other (comments)     Yeast Infection    Percocet [Oxycodone-Acetaminophen] Hives       Review of Systems:  Denies  chest pain, leg pain, nausea, vomiting, difficulty swallowing, headache, and dyspnea. Complains of numbness, tingling, and double vision, but improving. Objective:     Vitals:    04/16/21 2230 04/16/21 2300 04/16/21 2330 04/17/21 0000   BP: (!) 153/68 (!) 157/69 139/62 (!) 157/59   Pulse: 83 79 78 81   Resp: 20 20 18 22   Temp:    99 °F (37.2 °C)   SpO2:    100%   Weight:       Height:          Physical Exam:  GENERAL: Calm, cooperative, NAD  SKIN: Warm, dry, color appropriate for ethnicity. Neurologic Exam:  Mental Status:  Alert and oriented x 4. Appropriate affect, mood and behavior. Language:    Normal fluency, repetition, comprehension and naming. Cranial Nerves:   Pupils 3 mm, equal, round and reactive to light. Visual fields full to confrontation. Extraocular movements intact. Facial sensation intact. Full facial strength, no asymmetry. Hearing grossly intact bilaterally. Mild dysarthria. Tongue protrudes to midline, palate elevates symmetrically. Shoulder shrug 5/5 bilaterally. Motor:    No pronator drift. Bulk and tone normal.      5/5 power in all extremities proximally and distally. No involuntary movements. Sensation:    Sensation intact throughout to light touch to cheeks, bilateral arms and bilateral lower legs. Numbness is stated to be on nose, around mouth and in fingers. Coordination & Gait: Normal. FTN and HTS intact with no ataxia present.     Labs:  Lab Results   Component Value Date/Time    WBC 6.0 04/16/2021 01:47 AM    HGB 13.9 04/16/2021 01:47 AM    HCT 42.2 04/16/2021 01:47 AM    PLATELET 981 (H) 29/60/7842 01:47 AM    MCV 86.1 04/16/2021 01:47 AM      Lab Results   Component Value Date/Time    Sodium 136 04/16/2021 01:47 AM    Potassium 4.1 04/16/2021 01:47 AM    Chloride 105 04/16/2021 01:47 AM    CO2 26 04/16/2021 01:47 AM    Anion gap 5 04/16/2021 01:47 AM    Glucose 351 (H) 04/16/2021 01:47 AM    BUN 12 04/16/2021 01:47 AM    Creatinine 1.22 (H) 04/16/2021 01:47 AM    BUN/Creatinine ratio 10 (L) 04/16/2021 01:47 AM    GFR est AA 55 (L) 04/16/2021 01:47 AM    GFR est non-AA 45 (L) 04/16/2021 01:47 AM    Calcium 8.7 04/16/2021 01:47 AM     Lab Results   Component Value Date/Time    Troponin-I, Qt. <0.05 04/16/2021 01:47 AM       Imaging:  CT Results (maximum last 3): Results from Hospital Encounter encounter on 04/16/21   CT HEAD WO CONT    Narrative HISTORY:  facial numbness, r/o cva  Dose reduction technique: All CT scans at this facility are performed using dose reduction optimization  technique as appropriate on the exam including the following: Automated exposure  control, adjustment of the MA and/or KV according to patient size and/or use of  iterative reconstructive technique. TECHNIQUE: [Without contrast]  COMPARISON: [None]  LIMITATIONS: [None]    BRAIN: Moderate patchy areas of subcortical and periventricular white matter  hypodensity. Irregular shaped focus of hyperdensity in the andressa measures up to  1.0 x 2.0 x 1.5 cm. VENTRICLES: No hydrocephalus. EXTRA-AXIAL SPACES/SULCI:  No extra-axial hemorrhage, fluid collection or mass. CALVARIUM/SKULL BASE: Normal    FACE/SINUSES: Heterogeneous partially hypodense and partially calcified  opacification of the right maxillary sinus. SOFT TISSUES: Normal    OTHER: None      Impression Findings compatible with acute andressa hemorrhage. Moderate patchy  white matter abnormality as described is nonspecific but could indicate sequela  of chronic small vessel disease. Chronic heterogeneous and partially hyperdense  opacification of the right maxillary sinus may indicate complex infection such  as fungal sinusitis.     Results called to Ankita Lawton on 4/16/2021 1:57 AM.     Assessment:     Hospital Problems  Date Reviewed: 3/11/2021          Codes Class Noted POA    [facility] ICD-10-CM: I61.9  ICD-9-CM: 167  4/16/2021 Unknown            Plan:   1) Intracerebral Hemorrhage, ICH score: 1   - CT head showed acute andressa hemorrhage. Moderate patchy  white matter abnormality as described is nonspecific but could indicate sequela of chronic small vessel disease. Chronic heterogeneous and partially hyperdense opacification of the right maxillary sinus may indicate complex infection such as fungal sinusitis. - SBP goal less than 160, Cardene/Labetalol PRN, po medications as able. -Hold antiplatelet therapy for at least 2 weeks   - q 2 hour neuro checks    - A1C and lipid panel pending, LDL goal <70   -Repeat CTH 4/16 at 9:20 am showed no significant change in the mid andressa hemorrhage.               - MRI 4/16 showed stable acute hemorrhage in the andressa with mild surrounding edema. No significant mass effect. There is moderate chronic microvascular ischemic disease and a small chronic infarct in the right thalamus.               - ECHO with LVEF of 65-70%. With normal cavity size and systolic function and moderate concentric hypertrophy. Normal wall motion. There is mitral valve thickening, moderate mitral annular calcification and moderately decreased posterior leaflet mobility of the mitral valve.               - PT/OT/SLP evals              - Stroke education   - NSGY consulted, no intervention indicated at this time. I have discussed the diagnosis and the intended plan as seen in the above orders with Dr. Feliz Pro, the patient and the primary RN. Patient updated on current plan of care and is in agreement. Further recommendations to follow. Thank you for this consult and participating in the care of this patient.   Signed By: Mik Floyd NP     April 17, 2021        Mik Floyd NP  04/17/21

## 2021-04-17 NOTE — PROGRESS NOTES
0730: Bedside shift change report given to Magaly Castillo RN (oncoming nurse) by Solomon Valentine RN and Marj MCDONNELL (offgoing nurse). Report included the following information SBAR, Kardex, ED Summary, Procedure Summary, Intake/Output, MAR, Accordion, Recent Results and Dual Neuro Assessment.

## 2021-04-17 NOTE — PROGRESS NOTES
Shift summary: During the night pt had episode of BP greater than 160, treated with hydralazine and labetalol, kept off cardene drip. Pt was started on metoprolol PO and home meds. C/o of intermittent headache pain in back of head, tylenol effective. C/o 1x epigastric pain; EKG obtained, protonix given; pain relieved. Pt remains dizzy along with double vision when ambulating and turning in the bed. Pt wears eye patch in order to help alleviate double vision.

## 2021-04-17 NOTE — PROGRESS NOTES
[facility]    ICU TEAM Progress Note    Name: Caroline Grover   : 1965   MRN: [id number]   Date: 2021      Reason for ICU Admission: Pontine hemorrhage        HPI:    Alesha Farley is a 61 y. o. female with a past history of DMII, CVA with no residual effects, HTN, hyperlipidemia, recent ear infection and smoking. States that Wednesday night while she was watching television she began having numbness of her nose and area around her mouth. She started having tingling in her fingers bilaterally. Generalized weakness gradually ensued and she could not stand. She also began having blurred and double vision that has been going on for the last 24 hours. Patient had a stroke in the past so she decided she to get evaluated in the ER. Patient had an elevated BP on arrival of 190/83. She was taken for [facility] which showed acute andressa hemorrhage 1 x 2 x 1.5 cm. No hydrocephalus. Dr. Devora Singh with [facility] was consulted. No neurosurgical intervention is indicated at this time. She was transferred to [address] [place] [address] ER for a higher level of care where she is awaiting an ICU bed. POD:  * No surgery found *    S/P:       Plan:     Plans for this Shift:      1. Admit to ICU  2. Consult to neurology and neurosurgery, appreciate input-medical management   3. Q2h neurological checks  4. Cardene is needed to maintain - 160mmHg  5. Repeat CT head today, obtain MRI as well  6. PT/OT/SLP  7. Smoking and drug cessation counseling  8. SBP Goal of: < 160 mmHg  9. MAP Goal of: 65-85 mmHg  10. Nicardipine (Cardene) - For above SBP/MAP goals  11. IVFs: None  12. Transfusion Trigger (Hgb): <7 g/dL  13. Respiratory Goals:  a. IS  14. Pulmonary toilet: Incentive Spirometry   15. SpO2 Goal: > 92%  16. Keep K>4; Mg>2   17. PT/OT: PT consulted and on board, OT consulted and on board and Speech therapy consulted and on board   18. Goals of Care Discussion with family Yes   23. Plan of Care/Code Status: Full Code  20.  Appreciate Consultants Input   21. Discussed Care Plan with Bedside RN  22. Documentation of Current Medications  23. Rest of Plan Below:     F - Feeding:  Regular thins per speech recs, appreciate input   A - Analgesia: Fentanyl and Acetaminophen  S - Sedation: N/A  T - DVT Prophylaxis: SCD's or Sequential Compression Device   H - Head of Bed: > 30 Degrees  U - Ulcer Prophylaxis: Not at this time   G - Glycemic Control: Insulin  S - Spontaneous Breathing Trial: N/A  B - Bowel Regimen: MiraLax  I - Indwelling Catheter:              Tubes: None  Lines: Peripheral IV  Drains: None  D - De-escalation of Antibiotics: None at this time    Subjective:   Overnight Events:   2021  NO AE overnight       Objective:     Visit Vitals  BP (!) 152/66   Pulse 67   Temp 97.9 °F (36.6 °C)   Resp 16   Ht 5' (1.524 m)   Wt 84.4 kg (186 lb)   SpO2 100%   BMI 36.33 kg/m²      O2 Device: None (Room air) Temp (24hrs), Av.3 °F (36.8 °C), Min:97.4 °F (36.3 °C), Max:99 °F (37.2 °C)           Hemodynamics:   PAP:   CO:     Wedge:   CI:     CVP:    SVR:       PVR:       Ventilator Settings:  Mode Rate Tidal Volume Pressure FiO2 PEEP                    Peak airway pressure:      Minute ventilation:          Intake/Output:     Intake/Output Summary (Last 24 hours) at 2021 0830  Last data filed at 2021 0400  Gross per 24 hour   Intake 240 ml   Output --   Net 240 ml       Physical Exam:  General:  Alert, cooperative, well noursished, well developed, appears stated age   Eyes:  Sclera anicteric. Pupils equally round and reactive to light. Mouth/Throat: Mucous membranes normal, oral pharynx clear   Neck: Supple   Lungs:   Clear to auscultation bilaterally, good effort   CV:  Regular rate and rhythm,no murmur, click, rub or gallop   Abdomen:   Soft, non-tender.  bowel sounds normal. non-distended   Extremities: No cyanosis or edema   Skin: Skin color, texture, turgor normal. no acute rash or lesions   Lymph nodes: Cervical and supraclavicular normal   Musculoskeletal: No swelling or deformity   Lines/Devices:  Intact, no erythema, drainage or tenderness   Psych: Alert and oriented, normal mood affect given the setting  Neuro- Co numbness of the bilateral fingers and central face. Alert and oriented to self, place and time. LEY FAC equally        Labs & Data: Reviewed    Medications: Reviewed    Chest X-Ray:    TTE:    Multidisciplinary Rounds Completed: Yes    ABCDEF Bundle/Checklist Completed: Yes    SPECIAL EQUIPMENT: None    DISPOSITION: Stay in ICU    CRITICAL CARE CONSULTANT NOTE  I had a face to face encounter with the patient, reviewed and interpreted patient data including clinical events, labs, images, vital signs, I/O's, and examined patient. I have discussed the case and the plan and management of the patient's care with the consulting services, the bedside nurses and the respiratory therapist.      NOTE OF PERSONAL INVOLVEMENT IN CARE   This patient has a high probability of imminent, clinically significant deterioration, which requires the highest level of preparedness to intervene urgently. I participated in the decision-making and personally managed or directed the management of the following life and organ supporting interventions that required my frequent assessment to treat or prevent imminent deterioration. I personally spent 45 minutes of critical care time. This is time spent at this critically ill patient's bedside actively involved in patient care as well as the coordination of care and discussions with the patient's family. This does not include any procedural time which has been billed separately.     Vika Borges NP    [facility] Critical Care  4/17/2021

## 2021-04-18 LAB
ANION GAP SERPL CALC-SCNC: 6 MMOL/L (ref 5–15)
BUN SERPL-MCNC: 13 MG/DL (ref 6–20)
BUN/CREAT SERPL: 15 (ref 12–20)
CALCIUM SERPL-MCNC: 8.9 MG/DL (ref 8.5–10.1)
CHLORIDE SERPL-SCNC: 106 MMOL/L (ref 97–108)
CO2 SERPL-SCNC: 26 MMOL/L (ref 21–32)
CREAT SERPL-MCNC: 0.88 MG/DL (ref 0.55–1.02)
ERYTHROCYTE [DISTWIDTH] IN BLOOD BY AUTOMATED COUNT: 14.8 % (ref 11.5–14.5)
EST. AVERAGE GLUCOSE BLD GHB EST-MCNC: 309 MG/DL
GLUCOSE BLD STRIP.AUTO-MCNC: 121 MG/DL (ref 65–100)
GLUCOSE BLD STRIP.AUTO-MCNC: 184 MG/DL (ref 65–100)
GLUCOSE BLD STRIP.AUTO-MCNC: 275 MG/DL (ref 65–100)
GLUCOSE BLD STRIP.AUTO-MCNC: 316 MG/DL (ref 65–100)
GLUCOSE SERPL-MCNC: 254 MG/DL (ref 65–100)
HBA1C MFR BLD: 12.4 % (ref 4–5.6)
HCT VFR BLD AUTO: 40.9 % (ref 35–47)
HGB BLD-MCNC: 13.2 G/DL (ref 11.5–16)
MCH RBC QN AUTO: 27.6 PG (ref 26–34)
MCHC RBC AUTO-ENTMCNC: 32.3 G/DL (ref 30–36.5)
MCV RBC AUTO: 85.4 FL (ref 80–99)
NRBC # BLD: 0 K/UL (ref 0–0.01)
NRBC BLD-RTO: 0 PER 100 WBC
PLATELET # BLD AUTO: 434 K/UL (ref 150–400)
PMV BLD AUTO: 9.9 FL (ref 8.9–12.9)
POTASSIUM SERPL-SCNC: 3.6 MMOL/L (ref 3.5–5.1)
RBC # BLD AUTO: 4.79 M/UL (ref 3.8–5.2)
SERVICE CMNT-IMP: ABNORMAL
SODIUM SERPL-SCNC: 138 MMOL/L (ref 136–145)
WBC # BLD AUTO: 5.7 K/UL (ref 3.6–11)

## 2021-04-18 PROCEDURE — 82962 GLUCOSE BLOOD TEST: CPT

## 2021-04-18 PROCEDURE — 65660000000 HC RM CCU STEPDOWN

## 2021-04-18 PROCEDURE — 74011250636 HC RX REV CODE- 250/636: Performed by: NURSE PRACTITIONER

## 2021-04-18 PROCEDURE — 99232 SBSQ HOSP IP/OBS MODERATE 35: CPT | Performed by: PSYCHIATRY & NEUROLOGY

## 2021-04-18 PROCEDURE — APPNB30 APP NON BILLABLE TIME 0-30 MINS: Performed by: NURSE PRACTITIONER

## 2021-04-18 PROCEDURE — 80048 BASIC METABOLIC PNL TOTAL CA: CPT

## 2021-04-18 PROCEDURE — 74011250637 HC RX REV CODE- 250/637: Performed by: NURSE PRACTITIONER

## 2021-04-18 PROCEDURE — 74011636637 HC RX REV CODE- 636/637: Performed by: NURSE PRACTITIONER

## 2021-04-18 PROCEDURE — 85027 COMPLETE CBC AUTOMATED: CPT

## 2021-04-18 PROCEDURE — 74011000250 HC RX REV CODE- 250: Performed by: NURSE PRACTITIONER

## 2021-04-18 PROCEDURE — 36415 COLL VENOUS BLD VENIPUNCTURE: CPT

## 2021-04-18 PROCEDURE — 74011250637 HC RX REV CODE- 250/637: Performed by: STUDENT IN AN ORGANIZED HEALTH CARE EDUCATION/TRAINING PROGRAM

## 2021-04-18 RX ORDER — LABETALOL HYDROCHLORIDE 5 MG/ML
10 INJECTION, SOLUTION INTRAVENOUS
Status: DISCONTINUED | OUTPATIENT
Start: 2021-04-18 | End: 2021-04-19

## 2021-04-18 RX ADMIN — INSULIN GLARGINE 30 UNITS: 100 INJECTION, SOLUTION SUBCUTANEOUS at 08:38

## 2021-04-18 RX ADMIN — HYDRALAZINE HYDROCHLORIDE 20 MG: 20 INJECTION INTRAMUSCULAR; INTRAVENOUS at 12:25

## 2021-04-18 RX ADMIN — LOSARTAN POTASSIUM 50 MG: 50 TABLET, FILM COATED ORAL at 08:38

## 2021-04-18 RX ADMIN — ACETAMINOPHEN 650 MG: 325 TABLET, FILM COATED ORAL at 18:45

## 2021-04-18 RX ADMIN — HYDRALAZINE HYDROCHLORIDE 20 MG: 20 INJECTION INTRAMUSCULAR; INTRAVENOUS at 00:53

## 2021-04-18 RX ADMIN — ASPIRIN 81 MG: 81 TABLET, COATED ORAL at 08:38

## 2021-04-18 RX ADMIN — LABETALOL HYDROCHLORIDE 100 MG: 100 TABLET, FILM COATED ORAL at 17:03

## 2021-04-18 RX ADMIN — PANTOPRAZOLE SODIUM 40 MG: 40 TABLET, DELAYED RELEASE ORAL at 08:38

## 2021-04-18 RX ADMIN — ACETAMINOPHEN 650 MG: 325 TABLET, FILM COATED ORAL at 02:56

## 2021-04-18 RX ADMIN — HYDRALAZINE HYDROCHLORIDE 20 MG: 20 INJECTION INTRAMUSCULAR; INTRAVENOUS at 23:05

## 2021-04-18 RX ADMIN — INSULIN LISPRO 2 UNITS: 100 INJECTION, SOLUTION INTRAVENOUS; SUBCUTANEOUS at 08:45

## 2021-04-18 RX ADMIN — Medication 10 ML: at 08:40

## 2021-04-18 RX ADMIN — HYDRALAZINE HYDROCHLORIDE 20 MG: 20 INJECTION INTRAMUSCULAR; INTRAVENOUS at 17:03

## 2021-04-18 RX ADMIN — LABETALOL HYDROCHLORIDE 100 MG: 100 TABLET, FILM COATED ORAL at 08:38

## 2021-04-18 RX ADMIN — LABETALOL HYDROCHLORIDE 10 MG: 5 INJECTION INTRAVENOUS at 21:42

## 2021-04-18 RX ADMIN — MECLIZINE 25 MG: 12.5 TABLET ORAL at 21:48

## 2021-04-18 RX ADMIN — INSULIN LISPRO 4 UNITS: 100 INJECTION, SOLUTION INTRAVENOUS; SUBCUTANEOUS at 21:52

## 2021-04-18 RX ADMIN — AMLODIPINE BESYLATE 5 MG: 5 TABLET ORAL at 08:37

## 2021-04-18 RX ADMIN — AZITHROMYCIN MONOHYDRATE 500 MG: 500 INJECTION, POWDER, LYOPHILIZED, FOR SOLUTION INTRAVENOUS at 12:25

## 2021-04-18 RX ADMIN — Medication 10 ML: at 14:00

## 2021-04-18 RX ADMIN — INSULIN LISPRO 5 UNITS: 100 INJECTION, SOLUTION INTRAVENOUS; SUBCUTANEOUS at 12:34

## 2021-04-18 RX ADMIN — Medication 10 ML: at 21:49

## 2021-04-18 NOTE — PROGRESS NOTES
[facility]  Hospitalist Group     ICU Transfer/Accept Summary     [place] [address] [place]  DATE OF TRANSFER: 4/18/2021       PATIENT ID: Alesha Howe  MRN: [id number]   YOB: 1965    PRIMARY CARE PROVIDER: Jhon Bethea NP   DATE OF ADMISSION: 4/16/2021  4:35 AM    ATTENDING PHYSICIAN: Essence Kline NP  CONSULTATIONS:   IP CONSULT TO NEUROSURGERY    PROCEDURES/SURGERIES:   * No surgery found *    REASON FOR ADMISSION: <principal problem not specified>     HOSPITAL PROBLEM LIST:  Patient Active Problem List   Diagnosis Code    [facility] E11.65    Hyperlipidemia E78.5    Hypertensive disorder I10    History of recent dental procedure Z98.890    Type 2 diabetes mellitus with hyperglycemia, with long-term current use of insulin (HCC) E11.65, Z79.4    Noncompliance with medication regimen Z91.14    ICH (intracerebral hemorrhage) ([facility]) I61.9         Brief HPI and Hospital Course:    Per H&P: Alesha Farley is a 61 y. o. female with a past history of DMII, CVA with no residual effects, HTN, hyperlipidemia, recent ear infection and smoking. States that Wednesday night while she was watching television she began having numbness of her nose and area around her mouth. She started having tingling in her fingers bilaterally. Generalized weakness gradually ensued and she could not stand. She also began having blurred and double vision that has been going on for the last 24 hours. Patient had a stroke in the past so she decided she to get evaluated in the ER. Patient had an elevated BP on arrival of 190/83. She was taken for [facility] which showed acute andressa hemorrhage 1 x 2 x 1.5 cm. No hydrocephalus. Dr. Marcus Rodriguez with [facility] was consulted. No neurosurgical intervention is indicated at this time.  She was transferred [facility] for a higher level of care where she is awaiting an ICU bed.    04/18: Seen and examined patient. Daughter at bedside. States that she feels like she is slowly getting better. Still having numbness and tingling in fingertips and left facial numbness. No new complaints noted. Assessment and Plan:    Intracerebral Hemorrhage   - CT head showed acute andrsesa hemorrhage   - NIS consulted- No NIS intervention at this time   - Neurology following   - Per neurology- hold antiplatelets for two weeks then restart   - Maintain -160  - Neuro checks  q4 hours   - Stat head CT for any neuro changes   - PT/OT following      Hypertension   - Maintain spb 140-160  - Continue amlodipine, labetalol and cozaar   - PRN hydralazine   - monitor     DM   - A1c 12.4  - Monitor BG AC/HS  - Continue Lantus 30 units and ISS   - Change diet to diabetic diet   - Consult DM for DC medication recommendations     URTI   - On azithromycin IV x 5 days            PHYSICAL EXAMINATION:  Visit Vitals  BP (!) 142/60   Pulse 91   Temp 98 °F (36.7 °C)   Resp 26   Ht 5' (1.524 m)   Wt 75.1 kg (165 lb 9.1 oz)   SpO2 100%   BMI 32.33 kg/m²       General:          Alert, cooperative, no distress, answers questions  HEENT:           Atraumatic, MMM            Neck:               Supple, symmetrical,  thyroid: non tender  Lungs:             Clear to auscultation bilaterally. No Wheezing or Rhonchi. No rales. Heart:              Regular  rhythm,  No  murmur   No edema  Abdomen:       Soft, non-tender. Not distended. Bowel sounds normal  Extremities:     No cyanosis. No clubbing,  +2 distal pulses  Skin:                Not pale. Not Jaundiced  No rashes   Psych:             Not anxious or agitated.   Neurologic:      Alert, moves all extremities with good and equal strength, oriented X 3.     CODE STATUS:  X Full Code    DNR    Partial    Comfort Care     Signed:   Clovis Heimlich, NP  Date of Service:  4/18/2021  2:25 PM

## 2021-04-18 NOTE — PROGRESS NOTES
Neurology Progress Note  Mer Santoyo, NP    Patient: Alesha Smith MRN: [id number]  SSN: [id number]    YOB: 1965  Age: 61 y.o. Sex: female      Chief Complaint:Double vision, facial numbness, bilateral finger numbness, and generalized weakness. HPI: Alesha Smith is a 61 y.o. female with a past history of DMII, CVA with no residual effects, HTN, hyperlipidemia, recent ear infection and smoking. States that Wednesday night while she was watching television she began having numbness of her nose and area around her mouth. She started having tingling in her fingers bilaterally. Generalized weakness gradually ensued and she could not stand. She also began having blurred and double vision that has been going on for the last 24 hours. Patient had a stroke in the past so she decided she decided to get evaluated in the ER. Patient had an elevated BP on arrival of 190/83. She was taken for [facility] which showed acute andressa hemorrhage 1 x 2 x 1.5 cm. No hydrocephalus. Dr. Gar Meigs with [facility] was consulted. No neurosurgical intervention is indicated at this time. She was transferred to [facility] ER for a higher level of care where she is awaiting an ICU bed. Patient denies blood thinning medications, however, is on baby aspirin. She denies trauma. UDS positive for cocaine. Subjective:   No acute neuro events overnight.     Past Medical History:   Diagnosis Date    History of recent dental procedure 7/29/2020    Hyperlipidemia 7/29/2020    Hypertensive disorder 7/29/2020    Murmur     Stroke [facility])     Type II diabetes mellitus, uncontrolled ([address] 7/29/2020     Family History   Problem Relation Age of Onset    Diabetes Mother     Diabetes Paternal Grandmother      Social History     Tobacco Use    Smoking status: Current Every Day Smoker     Packs/day: 0.50    Smokeless tobacco: Never Used   Substance Use Topics    Alcohol use: Yes     Comment: daily      Prior to Admission Medications Prescriptions Last Dose Informant Patient Reported? Taking? Insulin Needles, Disposable, (BD Ultra-Fine Short Pen Needle) 31 gauge x \" ndle   No No   Sig: Once a day   Lancing Device misc   Yes No   Sig: by Does Not Apply route. acetaminophen (TYLENOL) 500 mg tablet   Yes No   Sig: Take  by mouth every six (6) hours as needed for Pain. alcohol swabs (Alcohol Pads) padm   Yes No   Sig: by Apply Externally route. amLODIPine (NORVASC) 5 mg tablet   Yes No   Sig: Take 5 mg by mouth daily. aspirin delayed-release 81 mg tablet   Yes No   Sig: Take  by mouth daily. cyanocobalamin (VITAMIN B-12) 1,000 mcg sublingual tablet   Yes No   Sig: Take 1,000 mcg by mouth daily. glucose blood VI test strips (True Metrix Glucose Test Strip) strip   No No   Sig: by Does Not Apply route See Admin Instructions. Twice a day   insulin glargine (Lantus Solostar U-100 Insulin) 100 unit/mL (3 mL) inpn   No No   Si units every morning   irbesartan (AVAPRO) 150 mg tablet   Yes No   Sig: Take 150 mg by mouth nightly. labetaloL (NORMODYNE) 100 mg tablet   Yes No   Sig: Take  by mouth two (2) times a day. lancets (Ultra-Care Lancets) 30 gauge misc   No No   Sig: Twice a day   latanoprost (XALATAN) 0.005 % ophthalmic solution   Yes No   Sig: Administer 1 Drop to both eyes nightly. meclizine (ANTIVERT) 25 mg tablet   Yes No   Sig: Take  by mouth three (3) times daily as needed for Dizziness. ofloxacin (FLOXIN) 0.3 % ophthalmic solution   Yes No   Sig: instill 10 DROPS into THE affected ear ONCE A DAY FOR 7 DAYS   omeprazole (PRILOSEC) 20 mg capsule   Yes No   Sig: Take 20 mg by mouth daily. Facility-Administered Medications: None       Allergies   Allergen Reactions    Penicillins Other (comments)     Yeast Infection    Percocet [Oxycodone-Acetaminophen] Hives       Review of Systems:  Denies  chest pain, leg pain, nausea, vomiting, difficulty swallowing, headache, and dyspnea.      Complains of numbness, tingling, and double vision, but improving. Objective:     Vitals:    04/17/21 2300 04/17/21 2330 04/18/21 0000 04/18/21 0030   BP: (!) 172/72 (!) 162/79 (!) 151/61 (!) 169/72   Pulse: 65 68 67 66   Resp: 17 21 18 18   Temp:   97.8 °F (36.6 °C)    SpO2: 99% 99% 100% 100%   Weight:       Height:          Physical Exam:  GENERAL: Calm, cooperative, NAD  SKIN: Warm, dry, color appropriate for ethnicity. Neurologic Exam:  Mental Status:  Alert and oriented x 4. Appropriate affect, mood and behavior. Language:    Normal fluency, repetition, comprehension and naming. Cranial Nerves:   Pupils 3 mm, equal, round and reactive to light. Visual fields full to confrontation. Extraocular movements intact. Facial sensation intact. Full facial strength, no asymmetry. Hearing grossly intact bilaterally. Mild dysarthria. Tongue protrudes to midline, palate elevates symmetrically. Shoulder shrug 5/5 bilaterally. Motor:    No pronator drift. Bulk and tone normal.      5/5 power in all extremities proximally and distally. No involuntary movements. Sensation:    Sensation intact throughout to light touch to cheeks, bilateral arms and bilateral lower legs. Numbness is stated to be on nose, around mouth and in fingers. Coordination & Gait: Normal. FTN and HTS intact with no ataxia present.     Labs:  Lab Results   Component Value Date/Time    WBC 5.5 04/17/2021 05:33 AM    HGB 13.2 04/17/2021 05:33 AM    HCT 41.3 04/17/2021 05:33 AM    PLATELET 670 (H) 82/44/7299 05:33 AM    MCV 86.2 04/17/2021 05:33 AM      Lab Results   Component Value Date/Time    Sodium 137 04/17/2021 05:33 AM    Potassium 3.6 04/17/2021 05:33 AM    Chloride 103 04/17/2021 05:33 AM    CO2 26 04/17/2021 05:33 AM    Anion gap 8 04/17/2021 05:33 AM    Glucose 251 (H) 04/17/2021 05:33 AM    BUN 14 04/17/2021 05:33 AM    Creatinine 0.97 04/17/2021 05:33 AM    BUN/Creatinine ratio 14 04/17/2021 05:33 AM GFR est AA >60 04/17/2021 05:33 AM    GFR est non-AA 59 (L) 04/17/2021 05:33 AM    Calcium 8.9 04/17/2021 05:33 AM     Lab Results   Component Value Date/Time    Troponin-I, Qt. <0.05 04/16/2021 01:47 AM       Imaging:  CT Results (maximum last 3): Results from Hospital Encounter encounter on 04/16/21   CT HEAD WO CONT    Narrative HISTORY:  facial numbness, r/o cva  Dose reduction technique: All CT scans at this facility are performed using dose reduction optimization  technique as appropriate on the exam including the following: Automated exposure  control, adjustment of the MA and/or KV according to patient size and/or use of  iterative reconstructive technique. TECHNIQUE: [Without contrast]  COMPARISON: [None]  LIMITATIONS: [None]    BRAIN: Moderate patchy areas of subcortical and periventricular white matter  hypodensity. Irregular shaped focus of hyperdensity in the andressa measures up to  1.0 x 2.0 x 1.5 cm. VENTRICLES: No hydrocephalus. EXTRA-AXIAL SPACES/SULCI:  No extra-axial hemorrhage, fluid collection or mass. CALVARIUM/SKULL BASE: Normal    FACE/SINUSES: Heterogeneous partially hypodense and partially calcified  opacification of the right maxillary sinus. SOFT TISSUES: Normal    OTHER: None      Impression Findings compatible with acute andressa hemorrhage. Moderate patchy  white matter abnormality as described is nonspecific but could indicate sequela  of chronic small vessel disease. Chronic heterogeneous and partially hyperdense  opacification of the right maxillary sinus may indicate complex infection such  as fungal sinusitis. Results called to Brandi Barajas on 4/16/2021 1:57 AM.     Assessment:     Hospital Problems  Date Reviewed: 3/11/2021          Codes Class Noted POA    ICH (intracerebral hemorrhage) ([address] ICD-10-CM: I61.9  ICD-9-CM: 699  4/16/2021 Unknown            Plan:   1) Intracerebral Hemorrhage, ICH score: 1 .  Hypertensive pontine hemorrhage.    - CT head showed acute andressa hemorrhage. Moderate patchy  white matter abnormality as described is nonspecific but could indicate sequela of chronic small vessel disease. Chronic heterogeneous and partially hyperdense opacification of the right maxillary sinus may indicate complex infection such as fungal sinusitis. - SBP goal 140-160, Cardene/Labetalol PRN, po medications as able. -Hold antiplatelet therapy for at least 2 weeks   - q 2 hour neuro checks    - A1C and lipid panel pending, LDL goal <70   -Repeat CTH 4/16 at 9:20 am showed no significant change in the mid andressa hemorrhage.               - MRI 4/16 showed stable acute hemorrhage in the andressa with mild surrounding edema. No significant mass effect. There is moderate chronic microvascular ischemic disease and a small chronic infarct in the right thalamus.               - ECHO with LVEF of 65-70%. With normal cavity size and systolic function and moderate concentric hypertrophy. Normal wall motion. There is mitral valve thickening, moderate mitral annular calcification and moderately decreased posterior leaflet mobility of the mitral valve.               - PT/OT/SLP evals              - Stroke education   - NSGY consulted, no intervention indicated at this time. I have discussed the diagnosis and the intended plan as seen in the above orders with Dr. Janette Stubbs, the patient and the primary RN. Patient updated on current plan of care and is in agreement. Further recommendations to follow. Thank you for this consult and participating in the care of this patient.   Signed By: Roxann Lozoya NP     April 18, 2021

## 2021-04-18 NOTE — PROGRESS NOTES
0730: Bedside shift change report given to Kin Lynch RN (oncoming nurse) by Gayathri Carr RN (offgoing nurse). Report included the following information SBAR, Kardex, ED Summary, Procedure Summary, Intake/Output, MAR, Accordion, Recent Results and Dual Neuro Assessment.

## 2021-04-18 NOTE — PROGRESS NOTES
[facility]    ICU TEAM Progress Note    Name: Ronnie aSnders   : 1965   MRN: [id number]   Date: 2021      Reason for ICU Admission: Pontine hemorrhage        HPI:    Alesha Farley is a 61 y. o. female with a past history of DMII, CVA with no residual effects, HTN, hyperlipidemia, recent ear infection and smoking. States that Wednesday night while she was watching television she began having numbness of her nose and area around her mouth. She started having tingling in her fingers bilaterally. Generalized weakness gradually ensued and she could not stand. She also began having blurred and double vision that has been going on for the last 24 hours. Patient had a stroke in the past so she decided she to get evaluated in the ER. Patient had an elevated BP on arrival of 190/83. She was taken for [facility] which showed acute andressa hemorrhage 1 x 2 x 1.5 cm. No hydrocephalus. Dr. Carmen Leal with [facility] was consulted. No neurosurgical intervention is indicated at this time. She was transferred to [address] [place] [address] ER for a higher level of care where she is awaiting an ICU bed. POD:  * No surgery found *    S/P:       Plan:     Plans for this Shift:        1. Consult to neurology and neurosurgery, appreciate input-medical management   2. Q4h neurological checks per neurology   3. Cardene is needed to maintain - 160mmHg-stable and improved   4. MRI appreciated, stable hemorrhage in the andressa with edema, no mass effect, small chronic infarct noted in the right thalamus   5. PT/OT/SLP  6. Smoking and drug cessation counseling  7. SBP Goal of: < 160 mmHg  8. MAP Goal of: 65-85 mmHg  9. Nicardipine (Cardene) - For above SBP/MAP goals  10. IVFs: None  11. Transfusion Trigger (Hgb): <7 g/dL  12. Respiratory Goals:  a. IS  13. Pulmonary toilet: Incentive Spirometry   14. SpO2 Goal: > 92%  15. Keep K>4; Mg>2   16.  PT/OT: PT consulted and on board, OT consulted and on board and Speech therapy consulted and on board 17. Goals of Care Discussion with family Yes   25. Plan of Care/Code Status: Full Code  19. Appreciate Consultants Input   20. Discussed Care Plan with Bedside RN  21. Documentation of Current Medications  22. Rest of Plan Below:     F - Feeding:  Regular thins per speech recs, appreciate input   A - Analgesia: Fentanyl and Acetaminophen  S - Sedation: N/A  T - DVT Prophylaxis: SCD's or Sequential Compression Device   H - Head of Bed: > 30 Degrees  U - Ulcer Prophylaxis: Not at this time   G - Glycemic Control: Insulin  S - Spontaneous Breathing Trial: N/A  B - Bowel Regimen: MiraLax  I - Indwelling Catheter:              Tubes: None  Lines: Peripheral IV  Drains: None  D - De-escalation of Antibiotics: None at this time    Subjective:   Overnight Events:   2021  NO AE overnight       Objective:     Visit Vitals  BP (!) 146/64   Pulse 72   Temp 97.6 °F (36.4 °C)   Resp 19   Ht 5' (1.524 m)   Wt 75.1 kg (165 lb 9.1 oz)   SpO2 98%   BMI 32.33 kg/m²      O2 Device: None (Room air) Temp (24hrs), Av.8 °F (36.6 °C), Min:97.6 °F (36.4 °C), Max:98 °F (36.7 °C)           Hemodynamics:   PAP:   CO:     Wedge:   CI:     CVP:    SVR:       PVR:       Ventilator Settings:  Mode Rate Tidal Volume Pressure FiO2 PEEP                    Peak airway pressure:      Minute ventilation:          Intake/Output:     Intake/Output Summary (Last 24 hours) at 2021 1210  Last data filed at 2021 0900  Gross per 24 hour   Intake 240 ml   Output --   Net 240 ml       Physical Exam:  General:  Alert, cooperative, well noursished, well developed, appears stated age   Eyes:  Sclera anicteric. Pupils equally round and reactive to light. Mouth/Throat: Mucous membranes normal, oral pharynx clear   Neck: Supple   Lungs:   Clear to auscultation bilaterally, good effort   CV:  Regular rate and rhythm,no murmur, click, rub or gallop   Abdomen:   Soft, non-tender.  bowel sounds normal. non-distended   Extremities: No cyanosis or edema Skin: Skin color, texture, turgor normal. no acute rash or lesions   Lymph nodes: Cervical and supraclavicular normal   Musculoskeletal: No swelling or deformity   Lines/Devices:  Intact, no erythema, drainage or tenderness   Psych: Alert and oriented, normal mood affect given the setting  Neuro- Co numbness of the bilateral fingers and central face. Alert and oriented to self, place and time. LEY FAC equally        Labs & Data: Reviewed    Medications: Reviewed    Chest X-Ray:    TTE:    Multidisciplinary Rounds Completed: Yes    ABCDEF Bundle/Checklist Completed: Yes    SPECIAL EQUIPMENT: None    DISPOSITION: NSTU per neurology     CRITICAL CARE CONSULTANT NOTE  I had a face to face encounter with the patient, reviewed and interpreted patient data including clinical events, labs, images, vital signs, I/O's, and examined patient. I have discussed the case and the plan and management of the patient's care with the consulting services, the bedside nurses and the respiratory therapist.      NOTE OF PERSONAL INVOLVEMENT IN CARE   This patient has a high probability of imminent, clinically significant deterioration, which requires the highest level of preparedness to intervene urgently. I participated in the decision-making and personally managed or directed the management of the following life and organ supporting interventions that required my frequent assessment to treat or prevent imminent deterioration. I personally spent 45 minutes of critical care time. This is time spent at this critically ill patient's bedside actively involved in patient care as well as the coordination of care and discussions with the patient's family. This does not include any procedural time which has been billed separately.     Ju Metcalf NP    [facility]  4/18/2021

## 2021-04-19 LAB
ANION GAP SERPL CALC-SCNC: 6 MMOL/L (ref 5–15)
ATRIAL RATE: 83 BPM
BUN SERPL-MCNC: 11 MG/DL (ref 6–20)
BUN/CREAT SERPL: 13 (ref 12–20)
CALCIUM SERPL-MCNC: 9.1 MG/DL (ref 8.5–10.1)
CALCULATED P AXIS, ECG09: 51 DEGREES
CALCULATED R AXIS, ECG10: 10 DEGREES
CALCULATED T AXIS, ECG11: 160 DEGREES
CHLORIDE SERPL-SCNC: 107 MMOL/L (ref 97–108)
CO2 SERPL-SCNC: 25 MMOL/L (ref 21–32)
CREAT SERPL-MCNC: 0.86 MG/DL (ref 0.55–1.02)
DIAGNOSIS, 93000: NORMAL
ERYTHROCYTE [DISTWIDTH] IN BLOOD BY AUTOMATED COUNT: 15.2 % (ref 11.5–14.5)
GLUCOSE BLD STRIP.AUTO-MCNC: 162 MG/DL (ref 65–100)
GLUCOSE BLD STRIP.AUTO-MCNC: 171 MG/DL (ref 65–100)
GLUCOSE BLD STRIP.AUTO-MCNC: 266 MG/DL (ref 65–100)
GLUCOSE BLD STRIP.AUTO-MCNC: 273 MG/DL (ref 65–100)
GLUCOSE SERPL-MCNC: 166 MG/DL (ref 65–100)
HCT VFR BLD AUTO: 41.4 % (ref 35–47)
HGB BLD-MCNC: 13.3 G/DL (ref 11.5–16)
MCH RBC QN AUTO: 27.7 PG (ref 26–34)
MCHC RBC AUTO-ENTMCNC: 32.1 G/DL (ref 30–36.5)
MCV RBC AUTO: 86.3 FL (ref 80–99)
NRBC # BLD: 0 K/UL (ref 0–0.01)
NRBC BLD-RTO: 0 PER 100 WBC
P-R INTERVAL, ECG05: 202 MS
PLATELET # BLD AUTO: 431 K/UL (ref 150–400)
PMV BLD AUTO: 9.7 FL (ref 8.9–12.9)
POTASSIUM SERPL-SCNC: 3.7 MMOL/L (ref 3.5–5.1)
Q-T INTERVAL, ECG07: 406 MS
QRS DURATION, ECG06: 90 MS
QTC CALCULATION (BEZET), ECG08: 477 MS
RBC # BLD AUTO: 4.8 M/UL (ref 3.8–5.2)
SERVICE CMNT-IMP: ABNORMAL
SODIUM SERPL-SCNC: 138 MMOL/L (ref 136–145)
VENTRICULAR RATE, ECG03: 83 BPM
WBC # BLD AUTO: 6.3 K/UL (ref 3.6–11)

## 2021-04-19 PROCEDURE — 74011250637 HC RX REV CODE- 250/637: Performed by: NURSE PRACTITIONER

## 2021-04-19 PROCEDURE — 74011000250 HC RX REV CODE- 250: Performed by: NURSE PRACTITIONER

## 2021-04-19 PROCEDURE — 65660000000 HC RM CCU STEPDOWN

## 2021-04-19 PROCEDURE — 74011250637 HC RX REV CODE- 250/637: Performed by: STUDENT IN AN ORGANIZED HEALTH CARE EDUCATION/TRAINING PROGRAM

## 2021-04-19 PROCEDURE — 74011636637 HC RX REV CODE- 636/637: Performed by: NURSE PRACTITIONER

## 2021-04-19 PROCEDURE — 36415 COLL VENOUS BLD VENIPUNCTURE: CPT

## 2021-04-19 PROCEDURE — 94760 N-INVAS EAR/PLS OXIMETRY 1: CPT

## 2021-04-19 PROCEDURE — 85027 COMPLETE CBC AUTOMATED: CPT

## 2021-04-19 PROCEDURE — 97535 SELF CARE MNGMENT TRAINING: CPT

## 2021-04-19 PROCEDURE — 74011250636 HC RX REV CODE- 250/636: Performed by: NURSE PRACTITIONER

## 2021-04-19 PROCEDURE — 82962 GLUCOSE BLOOD TEST: CPT

## 2021-04-19 PROCEDURE — 80048 BASIC METABOLIC PNL TOTAL CA: CPT

## 2021-04-19 PROCEDURE — 99232 SBSQ HOSP IP/OBS MODERATE 35: CPT | Performed by: NURSE PRACTITIONER

## 2021-04-19 RX ORDER — HYDRALAZINE HYDROCHLORIDE 20 MG/ML
10 INJECTION INTRAMUSCULAR; INTRAVENOUS
Status: DISCONTINUED | OUTPATIENT
Start: 2021-04-19 | End: 2021-04-20 | Stop reason: HOSPADM

## 2021-04-19 RX ORDER — LABETALOL HYDROCHLORIDE 5 MG/ML
10 INJECTION, SOLUTION INTRAVENOUS
Status: DISCONTINUED | OUTPATIENT
Start: 2021-04-19 | End: 2021-04-20 | Stop reason: HOSPADM

## 2021-04-19 RX ORDER — ATORVASTATIN CALCIUM 40 MG/1
40 TABLET, FILM COATED ORAL DAILY
Status: DISCONTINUED | OUTPATIENT
Start: 2021-04-19 | End: 2021-04-20 | Stop reason: HOSPADM

## 2021-04-19 RX ADMIN — ASPIRIN 81 MG: 81 TABLET, COATED ORAL at 08:41

## 2021-04-19 RX ADMIN — LOSARTAN POTASSIUM 50 MG: 50 TABLET, FILM COATED ORAL at 08:41

## 2021-04-19 RX ADMIN — LABETALOL HYDROCHLORIDE 100 MG: 100 TABLET, FILM COATED ORAL at 08:41

## 2021-04-19 RX ADMIN — INSULIN LISPRO 5 UNITS: 100 INJECTION, SOLUTION INTRAVENOUS; SUBCUTANEOUS at 16:44

## 2021-04-19 RX ADMIN — AZITHROMYCIN MONOHYDRATE 500 MG: 500 INJECTION, POWDER, LYOPHILIZED, FOR SOLUTION INTRAVENOUS at 11:38

## 2021-04-19 RX ADMIN — INSULIN LISPRO 2 UNITS: 100 INJECTION, SOLUTION INTRAVENOUS; SUBCUTANEOUS at 11:39

## 2021-04-19 RX ADMIN — INSULIN GLARGINE 30 UNITS: 100 INJECTION, SOLUTION SUBCUTANEOUS at 08:41

## 2021-04-19 RX ADMIN — INSULIN LISPRO 5 UNITS: 100 INJECTION, SOLUTION INTRAVENOUS; SUBCUTANEOUS at 08:41

## 2021-04-19 RX ADMIN — AMLODIPINE BESYLATE 5 MG: 5 TABLET ORAL at 08:41

## 2021-04-19 RX ADMIN — LABETALOL HYDROCHLORIDE 100 MG: 100 TABLET, FILM COATED ORAL at 17:10

## 2021-04-19 RX ADMIN — ATORVASTATIN CALCIUM 40 MG: 40 TABLET, FILM COATED ORAL at 11:39

## 2021-04-19 RX ADMIN — Medication 10 ML: at 05:42

## 2021-04-19 RX ADMIN — LABETALOL HYDROCHLORIDE 10 MG: 5 INJECTION INTRAVENOUS at 03:00

## 2021-04-19 RX ADMIN — ACETAMINOPHEN 650 MG: 325 TABLET, FILM COATED ORAL at 15:02

## 2021-04-19 RX ADMIN — Medication 10 ML: at 21:01

## 2021-04-19 RX ADMIN — PANTOPRAZOLE SODIUM 40 MG: 40 TABLET, DELAYED RELEASE ORAL at 08:41

## 2021-04-19 RX ADMIN — Medication 10 ML: at 15:02

## 2021-04-19 NOTE — PROGRESS NOTES
1930:  Bedside and Verbal shift change report given to Phu Kiran RN (oncoming nurse) by Shavonne Tran (offgoing nurse). Report included the following information SBAR, Kardex, ED Summary, OR Summary, Procedure Summary, Intake/Output, MAR, Accordion, Recent Results, Med Rec Status and Cardiac Rhythm sinus rhythm. Assumed care of patient. Patient is A&O x4, LEY, FC. Patient c/o diplopia and dizziness.  while patient is talking and moving. Assisted patient to bathroom. 2100: patient . Contacted hospitalist NP. Order for labetalol received. 0200: SBP >160. Patient was just up to the bathroom. Will recheck BP.    0259: attempted to call report. Patient has not been assigned. RN will call back. 7312: Attempted to call report. RN is in room with another patient. 65:  TRANSFER - OUT REPORT:    Verbal report given to N[facility] RN(name) on April R Oral Pellant  being transferred to NSTU(unit) for routine progression of care       Report consisted of patients Situation, Background, Assessment and   Recommendations(SBAR). Information from the following report(s) SBAR, Kardex, ED Summary, OR Summary, Procedure Summary, Intake/Output, MAR, Accordion, Recent Results, Med Rec Status and Cardiac Rhythm sinus rhythm was reviewed with the receiving nurse. Lines:   Peripheral IV 04/18/21 Anterior;Left;Proximal Forearm (Active)   Site Assessment Clean, dry, & intact 04/19/21 0000   Phlebitis Assessment 0 04/19/21 0000   Infiltration Assessment 0 04/19/21 0000   Dressing Status Clean, dry, & intact 04/19/21 0000   Dressing Type Tape;Transparent 04/19/21 0000   Hub Color/Line Status Blue;Capped 04/19/21 0000   Action Taken Open ports on tubing capped 04/19/21 0000   Alcohol Cap Used Yes 04/19/21 0000        Opportunity for questions and clarification was provided.       Patient transported with:   Monitor  Registered Nurse

## 2021-04-19 NOTE — PROGRESS NOTES
Bedside and Verbal shift change report given to Willis (oncoming nurse) by Sharif Hooker (offgoing nurse). Report included the following information SBAR, Kardex, MAR, Recent Results, Cardiac Rhythm NS and Dual Neuro Assessment.

## 2021-04-19 NOTE — PROGRESS NOTES
Transition of Care Plan   RUR- 11% Low Risk   DISPOSITION: The disposition plan is home with family assistance HHPT/OT.  Transport: [facility]/family     CM met with patient and patients daughter at bedside to check in regarding aftercare plans. Patient has been recommended for HHPT/OT. CM provided patient with [facility] list. CM completed order and submit for review via [employer]. Patient inquired about SSI, CM provided patient with contact information. CM will continue to follow, provide support and assist with CINDY needs as they arise.     DANNY Monk,CRM

## 2021-04-19 NOTE — PROGRESS NOTES
Problem: Patient Education: Go to Patient Education Activity  Goal: Patient/Family Education  Outcome: Progressing Towards Goal     Problem: Patient Education: Go to Patient Education Activity  Goal: Patient/Family Education  Outcome: Progressing Towards Goal     Problem: Patient Education: Go to Patient Education Activity  Goal: Patient/Family Education  Outcome: Progressing Towards Goal     Problem: Diabetes Self-Management  Goal: *Disease process and treatment process  Description: Define diabetes and identify own type of diabetes; list 3 options for treating diabetes. Outcome: Progressing Towards Goal  Goal: *Incorporating nutritional management into lifestyle  Description: Describe effect of type, amount and timing of food on blood glucose; list 3 methods for planning meals. Outcome: Progressing Towards Goal  Goal: *Incorporating physical activity into lifestyle  Description: State effect of exercise on blood glucose levels. Outcome: Progressing Towards Goal  Goal: *Developing strategies to promote health/change behavior  Description: Define the ABC's of diabetes; identify appropriate screenings, schedule and personal plan for screenings. Outcome: Progressing Towards Goal  Goal: *Using medications safely  Description: State effect of diabetes medications on diabetes; name diabetes medication taking, action and side effects. Outcome: Progressing Towards Goal  Goal: *Monitoring blood glucose, interpreting and using results  Description: Identify recommended blood glucose targets  and personal targets. Outcome: Progressing Towards Goal  Goal: *Prevention, detection, treatment of acute complications  Description: List symptoms of hyper- and hypoglycemia; describe how to treat low blood sugar and actions for lowering  high blood glucose level.   Outcome: Progressing Towards Goal  Goal: *Prevention, detection and treatment of chronic complications  Description: Define the natural course of diabetes and describe the relationship of blood glucose levels to long term complications of diabetes. Outcome: Progressing Towards Goal  Goal: *Developing strategies to address psychosocial issues  Description: Describe feelings about living with diabetes; identify support needed and support network  Outcome: Progressing Towards Goal  Goal: *Insulin pump training  Outcome: Progressing Towards Goal  Goal: *Sick day guidelines  Outcome: Progressing Towards Goal  Goal: *Patient Specific Goal (EDIT GOAL, INSERT TEXT)  Outcome: Progressing Towards Goal     Problem: Patient Education: Go to Patient Education Activity  Goal: Patient/Family Education  Outcome: Progressing Towards Goal     Problem: Falls - Risk of  Goal: *Absence of Falls  Description: Document Jamil Fall Risk and appropriate interventions in the flowsheet.   Outcome: Progressing Towards Goal  Note: Fall Risk Interventions:  Mobility Interventions: Bed/chair exit alarm, Patient to call before getting OOB         Medication Interventions: Bed/chair exit alarm, Evaluate medications/consider consulting pharmacy, Patient to call before getting OOB    Elimination Interventions: Bed/chair exit alarm, Call light in reach, Patient to call for help with toileting needs, Stay With Me (per policy)              Problem: Patient Education: Go to Patient Education Activity  Goal: Patient/Family Education  Outcome: Progressing Towards Goal

## 2021-04-19 NOTE — PROGRESS NOTES
[id number] [facility] Adult  Hospitalist Group                                                                                          Hospitalist Progress Note  Hussain Ortega MD  Answering service: [phone] OR [phone] from in house phone        Date of Service:  2021  NAME:  Alesha Finney  :  1965  MRN:  [id number]      Admission Summary:   HPI:Alesha Farley is a 61 y. o. female with a past history of DMII, CVA with no residual effects, HTN, hyperlipidemia, recent ear infection and smoking. States that Wednesday night while she was watching television she began having numbness of her nose and area around her mouth. She started having tingling in her fingers bilaterally. Generalized weakness gradually ensued and she could not stand. She also began having blurred and double vision that has been going on for the last 24 hours. Patient had a stroke in the past so she decided she to get evaluated in the ER. Patient had an elevated BP on arrival of 190/83. She was taken for [facility] which showed acute andressa hemorrhage 1 x 2 x 1.5 cm. No hydrocephalus. Dr. Kareen Foy with [facility] was consulted. No neurosurgical intervention is indicated at this time. She was transferred to [facility] ER for a higher level of care where she is awaiting an ICU bed.        Interval history / Subjective:     2021 :    Improved day over day, less geeta numbness hand/face    Else as below        Assessment & Plan:     1. Pontine Hemorrhage ; recoving, less geeta hand/mid face numbness. 2. Hypertension - contorled goals 120 - 160 sys t  3. Type 2 DM - SSI , coverage  4. Hyperlipidemia ; statin  5. Cocaine use -discussed  6. Tobacco abuse-discussed      Hospital Problems  Date Reviewed: 3/11/2021          Codes Class Noted POA    [facility] ICD-10-CM: I61.9  ICD-9-CM: 671  2021 Unknown                  After personally interviewing pt at bedside the following is noted on 2021 :    Review of Systems Constitutional: Negative. Eyes: Negative. Respiratory: Negative. Cardiovascular: Negative. Gastrointestinal: Negative. Musculoskeletal: Negative. Skin: Negative. Neurological:        Numbess face geeta hands better   Psychiatric/Behavioral: Negative. I had a face to face encounter with patient on 4/19/2021 at bedside for the following physical exam:     PHYSICAL EXAM:    Visit Vitals  /81 (BP 1 Location: Left upper arm, BP Patient Position: At rest)   Pulse 72   Temp 98 °F (36.7 °C)   Resp 19   Ht 5' (1.524 m)   Wt 78.7 kg (173 lb 8 oz)   SpO2 98%   BMI 33.88 kg/m²          Physical Exam  Constitutional:       General: She is not in acute distress. Appearance: Normal appearance. She is obese. She is not ill-appearing. HENT:      Head: Normocephalic and atraumatic. Right Ear: External ear normal.      Left Ear: External ear normal.      Nose: Nose normal.      Mouth/Throat:      Mouth: Mucous membranes are moist.   Eyes:      Pupils: Pupils are equal, round, and reactive to light. Neck:      Musculoskeletal: Normal range of motion and neck supple. Cardiovascular:      Rate and Rhythm: Normal rate. Abdominal:      General: Abdomen is flat. Musculoskeletal:         General: No swelling or tenderness. Skin:     General: Skin is warm.    Neurological:      Comments: No gross motor deficits    Psychiatric:         Mood and Affect: Mood normal.         Behavior: Behavior normal.                     Data Review:    Review and/or order of clinical lab test      Labs:     Recent Labs     04/19/21  0540 04/18/21  0451   WBC 6.3 5.7   HGB 13.3 13.2   HCT 41.4 40.9   * 434*     Recent Labs     04/19/21  0540 04/18/21  0451 04/17/21  0533    138 137   K 3.7 3.6 3.6    106 103   CO2 25 26 26   BUN 11 13 14   CREA 0.86 0.88 0.97   * 254* 251*   CA 9.1 8.9 8.9   MG  --   --  1.9     No results for input(s): ALT, AP, TBIL, TBILI, TP, ALB, GLOB, GGT, AML, LPSE in the last 72 hours. No lab exists for component: SGOT, GPT, AMYP, HLPSE  No results for input(s): INR, PTP, APTT, INREXT in the last 72 hours. No results for input(s): FE, TIBC, PSAT, FERR in the last 72 hours. No results found for: FOL, RBCF   No results for input(s): PH, PCO2, PO2 in the last 72 hours. No results for input(s): CPK, CKNDX, TROIQ in the last 72 hours.     No lab exists for component: CPKMB  Lab Results   Component Value Date/Time    Cholesterol, total 200 (H) 04/16/2021 05:53 AM    HDL Cholesterol 62 04/16/2021 05:53 AM    LDL, calculated 123 (H) 04/16/2021 05:53 AM    Triglyceride 75 04/16/2021 05:53 AM    CHOL/HDL Ratio 3.2 04/16/2021 05:53 AM     Lab Results   Component Value Date/Time    Glucose (POC) 162 (H) 04/19/2021 11:22 AM    Glucose (POC) 266 (H) 04/19/2021 08:29 AM    Glucose (POC) 316 (H) 04/18/2021 09:48 PM    Glucose (POC) 121 (H) 04/18/2021 05:00 PM    Glucose (POC) 275 (H) 04/18/2021 12:31 PM     Lab Results   Component Value Date/Time    Color Yellow/Straw 04/16/2021 03:10 AM    Appearance Turbid (A) 04/16/2021 03:10 AM    Specific gravity 1.028 04/16/2021 03:10 AM    pH (UA) 6.0 04/16/2021 03:10 AM    Protein Negative 04/16/2021 03:10 AM    Glucose >300 (A) 04/16/2021 03:10 AM    Ketone Negative 04/16/2021 03:10 AM    Bilirubin Negative 04/16/2021 03:10 AM    Urobilinogen 0.1 04/16/2021 03:10 AM    Nitrites Negative 04/16/2021 03:10 AM    Leukocyte Esterase Negative 04/16/2021 03:10 AM    Bacteria Negative 04/16/2021 03:10 AM    WBC 0-4 04/16/2021 03:10 AM    RBC 0-5 04/16/2021 03:10 AM         Medications Reviewed:     Current Facility-Administered Medications   Medication Dose Route Frequency    atorvastatin (LIPITOR) tablet 40 mg  40 mg Oral DAILY    labetaloL (NORMODYNE;TRANDATE) injection 10 mg  10 mg IntraVENous Q6H PRN    amLODIPine (NORVASC) tablet 5 mg  5 mg Oral DAILY    insulin glargine (LANTUS) injection 30 Units  30 Units SubCUTAneous DAILY  meclizine (ANTIVERT) tablet 25 mg  25 mg Oral Q6H PRN    losartan (COZAAR) tablet 50 mg  50 mg Oral DAILY    azithromycin (ZITHROMAX) 500 mg in 0.9% sodium chloride 250 mL (VIAL-MATE)  500 mg IntraVENous Q24H    hydrALAZINE (APRESOLINE) 20 mg/mL injection 20 mg  20 mg IntraVENous Q6H PRN    ondansetron (ZOFRAN) injection 4 mg  4 mg IntraVENous Q6H PRN    naloxone (NARCAN) injection 0.4 mg  0.4 mg IntraVENous PRN    sodium chloride (NS) flush 5-40 mL  5-40 mL IntraVENous Q8H    sodium chloride (NS) flush 5-40 mL  5-40 mL IntraVENous PRN    polyethylene glycol (MIRALAX) packet 17 g  17 g Oral DAILY PRN    glucose chewable tablet 16 g  4 Tab Oral PRN    dextrose (D50W) injection syrg 12.5-25 g  25-50 mL IntraVENous PRN    glucagon (GLUCAGEN) injection 1 mg  1 mg IntraMUSCular PRN    insulin lispro (HUMALOG) injection   SubCUTAneous AC&HS    pantoprazole (PROTONIX) tablet 40 mg  40 mg Oral ACB    acetaminophen (TYLENOL) tablet 650 mg  650 mg Oral Q6H PRN    labetaloL (NORMODYNE) tablet 100 mg  100 mg Oral BID     ______________________________________________________________________  EXPECTED LENGTH OF STAY: 2d 0h  ACTUAL LENGTH OF STAY:          3                 Good Beth MD

## 2021-04-19 NOTE — PROGRESS NOTES
Neurology Progress Note  Fantasma Foy NP      Date of admission: 2021    Patient: Alesha Woodruff MRN: [id number]  SSN: [id number]    YOB: 1965  Age: 61 y.o. Sex: female        Subjective:     HPI: Alesha Soria is a 61 y.o. female with a h/o HTN, HPL, DM, remote stroke, tobacco/substance abuse presenting on 21 with reported onset of face numbness/tingling b/l, hand numbness/tingling b/l, difficulty standing due to generalized weakness, dysarthria and diplopia which she first noted on 21. Symptoms are largely unchanged from onset. BP on arrival was 190/83. Tox screen was positive for cocaine. CT head/MRI Brain this admission noted an acute mid pontine hemorrhage with associated edema, remote R thalamic infarct and R maxillary sinusitis. She was taking ASA 81mg daily PTA. Interval 21:     Pt stable. Minimal diplopia with distance (improved), central face numbness and tingling and bilateral finger numbness and tingling otherwise no obvious deficits. C/o of some imbalance with standing. Review of systems  Review of systems negative except as detailed in the HPI, interval, PMH and A&P    Past Medical History:   Diagnosis Date    History of recent dental procedure 2020    Hyperlipidemia 2020    Hypertensive disorder 2020    Murmur     [facility]     Type II diabetes mellitus, uncontrolled ([facility] 2020     Past Surgical History:   Procedure Laterality Date    HX APPENDECTOMY      HX  SECTION      HX ORTHOPAEDIC        Family History   Problem Relation Age of Onset    Diabetes Mother     Diabetes Paternal Grandmother      Social History     Tobacco Use    Smoking status: Current Every Day Smoker     Packs/day: 0.50    Smokeless tobacco: Never Used   Substance Use Topics    Alcohol use: Yes     Comment: daily      Prior to Admission medications    Medication Sig Start Date End Date Taking?  Authorizing Provider   ofloxacin (FLOXIN) 0.3 % ophthalmic solution instill 10 DROPS into THE affected ear ONCE A DAY FOR 7 DAYS 2/9/21   Provider, Historical   insulin glargine (Lantus Solostar U-100 Insulin) 100 unit/mL (3 mL) inpn 30 units every morning 3/11/21   Ayse Bennett MD   Insulin Needles, Disposable, (BD Ultra-Fine Short Pen Needle) 31 gauge x 5/16\" ndle Once a day 3/11/21   Ayse Bennett MD   glucose blood VI test strips (True Metrix Glucose Test Strip) strip by Does Not Apply route See Admin Instructions. Twice a day 3/11/21   Ayse Bennett MD   lancets (Ultra-Care Lancets) 30 gauge misc Twice a day 3/11/21   Ayse Bennett MD   acetaminophen (TYLENOL) 500 mg tablet Take  by mouth every six (6) hours as needed for Pain. Provider, Historical   alcohol swabs (Alcohol Pads) padm by Apply Externally route. Provider, Historical   amLODIPine (NORVASC) 5 mg tablet Take 5 mg by mouth daily. Provider, Historical   aspirin delayed-release 81 mg tablet Take  by mouth daily. Provider, Historical   cyanocobalamin (VITAMIN B-12) 1,000 mcg sublingual tablet Take 1,000 mcg by mouth daily. Provider, Historical   irbesartan (AVAPRO) 150 mg tablet Take 150 mg by mouth nightly. Provider, Historical   labetaloL (NORMODYNE) 100 mg tablet Take  by mouth two (2) times a day. Provider, Historical   Lancing Device misc by Does Not Apply route. Provider, Historical   latanoprost (XALATAN) 0.005 % ophthalmic solution Administer 1 Drop to both eyes nightly. Provider, Historical   meclizine (ANTIVERT) 25 mg tablet Take  by mouth three (3) times daily as needed for Dizziness. Provider, Historical   omeprazole (PRILOSEC) 20 mg capsule Take 20 mg by mouth daily.     Provider, Historical     Current Facility-Administered Medications   Medication Dose Route Frequency Provider Last Rate Last Admin    labetaloL (NORMODYNE;TRANDATE) injection 10 mg  10 mg IntraVENous Q6H PRN Kel Lung, NP   10 mg at 04/19/21 0300    amLODIPine (NORVASC) tablet 5 mg  5 mg Oral DAILY Melani Chick, NP   5 mg at 04/19/21 0973    aspirin delayed-release tablet 81 mg  81 mg Oral DAILY Melani Duarte, NP   81 mg at 04/19/21 0841    insulin glargine (LANTUS) injection 30 Units  30 Units SubCUTAneous DAILY Melani Chick, NP   30 Units at 04/19/21 0841    meclizine (ANTIVERT) tablet 25 mg  25 mg Oral Q6H PRN Melani Chick, NP   25 mg at 04/18/21 2148    losartan (COZAAR) tablet 50 mg  50 mg Oral DAILY Melani Chick, NP   50 mg at 04/19/21 0841    azithromycin (ZITHROMAX) 500 mg in 0.9% sodium chloride 250 mL (VIAL-MATE)  500 mg IntraVENous Q24H Jessica Greening H, NP   500 mg at 04/18/21 1225    hydrALAZINE (APRESOLINE) 20 mg/mL injection 20 mg  20 mg IntraVENous Q6H PRN Welford Brow, NP   20 mg at 04/18/21 2305    ondansetron (ZOFRAN) injection 4 mg  4 mg IntraVENous Q6H PRN Melani Chick, NP   4 mg at 04/16/21 1041    naloxone (NARCAN) injection 0.4 mg  0.4 mg IntraVENous PRN Melani Duarte, NP        sodium chloride (NS) flush 5-40 mL  5-40 mL IntraVENous Q8H Melani Duarte, NP   10 mL at 04/19/21 0542    sodium chloride (NS) flush 5-40 mL  5-40 mL IntraVENous PRN Melani Duarte, NP        polyethylene glycol (MIRALAX) packet 17 g  17 g Oral DAILY PRN Melani Duarte, NP        glucose chewable tablet 16 g  4 Tab Oral PRN Mike Alberts MD        dextrose (D50W) injection syrg 12.5-25 g  25-50 mL IntraVENous PRN Mike Alberts MD        glucagon (GLUCAGEN) injection 1 mg  1 mg IntraMUSCular PRN Mike Alberts MD        insulin lispro (HUMALOG) injection   SubCUTAneous AC&HS Melani Duarte, NP   5 Units at 04/19/21 0841    pantoprazole (PROTONIX) tablet 40 mg  40 mg Oral ACB Siobhan DIAZ MD   40 mg at 04/19/21 0841    acetaminophen (TYLENOL) tablet 650 mg  650 mg Oral Q6H PRN Jacquelin Chavez, NP   650 mg at 04/18/21 1845    labetaloL (NORMODYNE) tablet 100 mg  100 mg Oral BID Jacquelin Chavez, NP   100 mg at 04/19/21 0841        Allergies Allergen Reactions    Penicillins Other (comments)     Yeast Infection    Percocet [Oxycodone-Acetaminophen] Hives       Objective:     Vitals:    21 0400 21 0600 21 0841 21 1007   BP: (!) 164/97 (!) 153/71 (!) 162/71 136/81   Pulse: 77 75 77    Resp: 14 16     Temp: 98.3 °F (36.8 °C) 98.2 °F (36.8 °C)     SpO2: 100% 98%          Temp (24hrs), Av.3 °F (36.8 °C), Min:98 °F (36.7 °C), Max:98.7 °F (37.1 °C)        O2 Device: None (Room air)         Intake/Output Summary (Last 24 hours) at 2021 1015  Last data filed at 2021 1500  Gross per 24 hour   Intake 250 ml   Output 200 ml   Net 50 ml       General: In NAD. Cardiac: RRR  Lungs: Unlabored breathing. Abdomen: Soft/NT/non-distended. Extremities. No edema. Neurologic Exam:  Mental Status:  Alert and oriented x 4. Language:    Grossly intact fluency and comprehension. No dysarthria. Cranial Nerves:   Pupils equal, round and reactive to light. Visual fields intact. Extraocular movements grossly intact w/o nystagmus      Facial sensation intact to LT     Facial activation symmetric. Hearing grossly intact. Motor:    Bulk and tone normal.      5/5 strength in all extremities. No pronator drift. No involuntary movements. Sensation:    Sensation intact throughout to light touch. Coordination & Gait: No ataxia with finger to nose. Gait deferred    LABS:  Recent Labs     21  0540 21  0451 21  0533   WBC 6.3 5.7 5.5   HGB 13.3 13.2 13.2   HCT 41.4 40.9 41.3   * 434* 410*     Recent Labs     21  0540 21  0451 21  0533    138 137   K 3.7 3.6 3.6    106 103   CO2 25 26 26   BUN 11 13 14   CREA 0.86 0.88 0.97   * 254* 251*   CA 9.1 8.9 8.9   MG  --   --  1.9     No results for input(s): ALT, AP, TBIL, TBILI, TP, ALB, GLOB, GGT, AML, LPSE in the last 72 hours.     No lab exists for component: SGOT, GPT, AMYP, HLPSE  No results for input(s): INR, PTP, APTT, INREXT in the last 72 hours. No results for input(s): PHI, PCO2I, PO2I, HCO3I in the last 72 hours. No results for input(s): CPK, CKNDX, TROIQ in the last 72 hours. No lab exists for component: CPKMB  Lab Results   Component Value Date/Time    Cholesterol, total 200 (H) 04/16/2021 05:53 AM    HDL Cholesterol 62 04/16/2021 05:53 AM    LDL, calculated 123 (H) 04/16/2021 05:53 AM    Triglyceride 75 04/16/2021 05:53 AM    CHOL/HDL Ratio 3.2 04/16/2021 05:53 AM     Lab Results   Component Value Date/Time    Glucose (POC) 266 (H) 04/19/2021 08:29 AM    Glucose (POC) 316 (H) 04/18/2021 09:48 PM    Glucose (POC) 121 (H) 04/18/2021 05:00 PM    Glucose (POC) 275 (H) 04/18/2021 12:31 PM    Glucose (POC) 184 (H) 04/18/2021 08:42 AM     Lab Results   Component Value Date/Time    Color Yellow/Straw 04/16/2021 03:10 AM    Appearance Turbid (A) 04/16/2021 03:10 AM    Specific gravity 1.028 04/16/2021 03:10 AM    pH (UA) 6.0 04/16/2021 03:10 AM    Protein Negative 04/16/2021 03:10 AM    Glucose >300 (A) 04/16/2021 03:10 AM    Ketone Negative 04/16/2021 03:10 AM    Bilirubin Negative 04/16/2021 03:10 AM    Urobilinogen 0.1 04/16/2021 03:10 AM    Nitrites Negative 04/16/2021 03:10 AM    Leukocyte Esterase Negative 04/16/2021 03:10 AM    Bacteria Negative 04/16/2021 03:10 AM    WBC 0-4 04/16/2021 03:10 AM    RBC 0-5 04/16/2021 03:10 AM       No results for input(s): FE, TIBC, PSAT, FERR in the last 72 hours. No results found for: FOL, RBCF   No results for input(s): PH, PCO2, PO2 in the last 72 hours. No results for input(s): CPK, CKNDX, TROIQ in the last 72 hours. No lab exists for component: CPKMB    Imaging:  No results found. CT Results:  Results from Hospital Encounter encounter on 04/16/21   CT HEAD WO CONT    Narrative EXAM: CT HEAD WO CONT    INDICATION: ICH follow-up    COMPARISON: 4/16/2021. CONTRAST: None. TECHNIQUE: Unenhanced CT of the head was performed using 5 mm images. Brain and  bone windows were generated. Coronal and sagittal reformats. CT dose reduction  was achieved through use of a standardized protocol tailored for this  examination and automatic exposure control for dose modulation. FINDINGS: There is a mid pontine acute hemorrhage measuring 1.7 x 0.7 x 1.3 cm  in size. There is suggestion of a tiny amount of adjacent edema. Fourth  ventricle is normal in appearance and unchanged. The ventricles are stable. There are moderate changes small vessel disease  periventricular white matter. The bone windows demonstrate under pneumatization of the left mastoid air  cells. . There is opacification right maxillary sinus. .      Impression No significant change in the hemorrhage in the mid andressa. CT HEAD WO CONT    Narrative HISTORY:  facial numbness, r/o cva  Dose reduction technique: All CT scans at this facility are performed using dose reduction optimization  technique as appropriate on the exam including the following: Automated exposure  control, adjustment of the MA and/or KV according to patient size and/or use of  iterative reconstructive technique. TECHNIQUE: [Without contrast]  COMPARISON: [None]  LIMITATIONS: [None]    BRAIN: Moderate patchy areas of subcortical and periventricular white matter  hypodensity. Irregular shaped focus of hyperdensity in the andressa measures up to  1.0 x 2.0 x 1.5 cm. VENTRICLES: No hydrocephalus. EXTRA-AXIAL SPACES/SULCI:  No extra-axial hemorrhage, fluid collection or mass. CALVARIUM/SKULL BASE: Normal    FACE/SINUSES: Heterogeneous partially hypodense and partially calcified  opacification of the right maxillary sinus. SOFT TISSUES: Normal    OTHER: None      Impression Findings compatible with acute andressa hemorrhage. Moderate patchy  white matter abnormality as described is nonspecific but could indicate sequela  of chronic small vessel disease.  Chronic heterogeneous and partially hyperdense  opacification of the right maxillary sinus may indicate complex infection such  as fungal sinusitis. Results called to Dejon Galarza on 4/16/2021 1:57 AM.       MRI Results:  Results from Hospital Encounter encounter on 04/16/21   MRI BRAIN WO CONT    Narrative EXAM: MRI BRAIN WO CONT    INDICATION: Intracerebral hemorrhage    COMPARISON: CT head 4/16/2021. CONTRAST: None. TECHNIQUE:    Multiplanar multisequence acquisition without contrast of the brain. FINDINGS:  Stable size of acute hemorrhage in the andressa measuring 2.0 x 0.8 x 1.2 cm (series  5 image 21), with mild surrounding edema. No significant mass effect. The ventricles are normal in size and position. Periventricular and deep white  matter T2/FLAIR hyperintensities, confluent in regions, consistent with moderate  chronic microvascular ischemic disease. No evidence of acute infarct. Small  chronic infarct in the right thalamus. There is a small chronic microhemorrhage  noted in the left thalamus. There is no cerebellar tonsillar herniation. Expected arterial flow-voids are present. Near complete opacification of the right maxillary sinus with inspissated, T1  hyperintense secretions. The mastoid air cells and middle ears are clear. The  orbital contents are within normal limits. No significant osseous or scalp  lesions are identified. Impression 1. Stable acute hemorrhage in the andressa and mild surrounding edema. No  significant mass effect. 2. Moderate chronic microvascular ischemic disease. Small chronic infarct in the  right thalamus. 3. Near complete opacification of the right maxillary sinus. XR Results   Results from [place] encounter on 04/16/21   XR CHEST PORT    Impression Negative. VAS/US Results (maximum last 3): No results found for this or any previous visit. TTE  04/16/21   ECHO ADULT COMPLETE 04/16/2021 4/16/2021    Narrative · Saline contrast was given to evaluate for intracardiac shunt.   · LV: Estimated LVEF is 65 - 70%. Normal cavity size and systolic function   (ejection fraction normal). Moderate concentric hypertrophy. Wall motion:   normal.  · MV: Mitral valve thickening. Moderate mitral annular calcification. Moderately decreased posterior leaflet mobility of the mitral valve. Signed by: Griselda Mu, MD         EKG  Results for orders placed or performed during the hospital encounter of 04/16/21   EKG, 12 LEAD, INITIAL   Result Value Ref Range    Ventricular Rate 65 BPM    Atrial Rate 65 BPM    P-R Interval 186 ms    QRS Duration 88 ms    Q-T Interval 444 ms    QTC Calculation (Bezet) 461 ms    Calculated P Axis 43 degrees    Calculated R Axis -11 degrees    Calculated T Axis 133 degrees    Diagnosis       Normal sinus rhythm  Left atrial enlargement  Left ventricular hypertrophy with repolarization abnormality  Abnormal ECG  When compared with ECG of 09-SEP-2020 12:35,  Questionable change in QRS axis  Non-specific change in ST segment in Inferior leads  ST now depressed in Lateral leads  T wave inversion no longer evident in Inferior leads  T wave inversion now evident in Lateral leads  Confirmed by Pat Pike ([id number]) on 4/16/2021 7:06:44 AM         Hospital Problems  Date Reviewed: 3/11/2021          Codes Class Noted POA    [facility] ICD-10-CM: I61.9  ICD-9-CM: 029  4/16/2021 Unknown              Assessment/Plan:     April R Christin Tse is a 61 y.o. female with a h/o HTN, HPL, DM, remote stroke, tobacco/substance abuse with tox screen positive for cocaine presented on 4/16/21 with two days of facial numbness/tingling b/l, hand numbness/tingling b/l, difficulty standing due to generalized weakness, dysarthria and diplopia with /83 on arrival; found to have an acute mid pontine acute hemorrhage with associated edema. She was taking ASA 81mg daily PTA. Given hx infarct recommend statin for .      Minimal diplopia (improved), central face numbness and tingling and bilateral finger numbness and tingling otherwise no obvious deficits. C/o of some imbalance with standing. Hypertensive pontine hemorrhage, moderate size, stable on CT and clinically. HTN likely exacerbated by cocaine abuse. Counseled to avoid drugs of that nature as she will likely not be as william next time. Also encouraged other stroke risk reduction strategies including maintaining a healthy weight, exercise, healthy diet, blood pressure control, smoking cessation. Hold antiplatelet therapy for at least 2 weeks in the setting of ICH   Restart ASA 81 mg on 4/28/21  Stat head CT if any neurologic change/new focal deficits. Tobacco and substance abuse counseling  Start atorvastatin 40 mg given HLD and prior infarct. SBP goal 140-160  PT/OT/ST evaluations. Can start discharge planning from neurological standpoint  We are available as needed. Thank you for this consult.     Signed By: Rufino Murguia NP     April 19, 2021 10:15 AM

## 2021-04-19 NOTE — PROGRESS NOTES
Problem: Self Care Deficits Care Plan (Adult)  Goal: *Acute Goals and Plan of Care (Insert Text)  Description:   FUNCTIONAL STATUS PRIOR TO ADMISSION: Patient was independent and active without use of DME. Pad for urinary incontinence when coughs. Buggy for clothing to daughter's home, 2 bus stops away. When experiencing vertigo and therefore double vision, covers one eye with patch, crawls on floor at home; daughter brings meds and groceries. Utilized bus system for transportation. Was told by physician to exercise, she does not like to exercise, so has been walking daily. She has lost 20 lbs doing so. From prior CVAs she understands the importance and is familiar with neuro plasticity, the benefits of mobility and maintaining independence. HOME SUPPORT: The patient lived alone with daughter to provide assistance PRN. Occupational Therapy Goals  Initiated 4/16/2021  1. Patient will perform bathing with minimal assistance/contact guard assist within 7 day(s). 2.  Patient will perform lower body dressing with minimal assistance/contact guard assist within 7 day(s). 3.  Patient will perform standing ADLs 5 mins with minimal assistance/contact guard assist within 7 day(s). 4.  Patient will perform toilet transfers with minimal assistance/contact guard assist within 7 day(s). 5.  Patient will perform all aspects of toileting with minimal assistance/contact guard assist within 7 day(s). 6.  Patient will utilize energy conservation techniques during functional activities with verbal cues within 7 day(s). Outcome: Progressing Towards Goal    OCCUPATIONAL THERAPY TREATMENT  Patient: Alesha Black (61 y.o. female)  Date: 4/19/2021  Diagnosis: [facility] [I61.9] <principal problem not specified>       Precautions: Fall, Other (comment)(SBP <160)  Chart, occupational therapy assessment, plan of care, and goals were reviewed.     ASSESSMENT  Patient continues with skilled OT services and is progressing towards goals. Patient cleared by RN to be seen, received in bed. Patient with SBA for bed mobility and CGA for functional mobility to/from bathroom. Patient with SBA for grooming while standing at sink and CGA for toileting in bathroom. Patient returned to  at end of session with call bell in reach, family bedside, RN aware. Will continue to follow, anticipate she will be able to go home with [facility] and increased assist from family. Current Level of Function Impacting Discharge (ADLs): up to CGA for ADLs    Other factors to consider for discharge: IND PTA         PLAN :  Patient continues to benefit from skilled intervention to address the above impairments. Continue treatment per established plan of care to address goals. Recommend with staff: Recommend with nursing, ADLs with supervision/setup, once Egress Test completed then OOB to chair 3x/day via gait belt and toileting via functional mobility to and from bathroom. Thank you for completing as able in order to maintain patient strength, endurance and independence. Recommend next OT session: full dressing routine    Recommendation for discharge: (in order for the patient to meet his/her long term goals)  Occupational therapy at least 2 days/week in the home AND ensure assist and/or supervision for safety with mobility and IADLs    This discharge recommendation:  Has been made in collaboration with the attending provider and/or case management    IF patient discharges home will need the following DME: none       SUBJECTIVE:   Patient stated My neck hurts a bit.     OBJECTIVE DATA SUMMARY:   Cognitive/Behavioral Status:  Neurologic State: Alert  Orientation Level: Oriented X4  Cognition: Appropriate for age attention/concentration; Follows commands  Perception: Appears intact  Perseveration: No perseveration noted  Safety/Judgement: Awareness of environment; Fall prevention    Functional Mobility and Transfers for ADLs:  Bed Mobility:  Supine to Sit: Stand-by assistance  Sit to Supine: Stand-by assistance    Transfers:  Sit to Stand: Contact guard assistance  Functional Transfers  Toilet Transfer : Contact guard assistance  Cues: Verbal cues provided  Adaptive Equipment: Grab bars       Balance:  Sitting: Intact  Standing: Impaired  Standing - Static: Good  Standing - Dynamic : Fair    ADL Intervention:       Grooming  Position Performed: Standing  Washing Face: Stand-by assistance  Brushing Teeth: Stand-by assistance  Brushing/Combing Hair: Stand-by assistance  Cues: Verbal cues provided    Lower Body Dressing Assistance  Socks: Stand-by assistance  Leg Crossed Method Used: Yes  Position Performed: Seated edge of bed  Cues: Don    Cognitive Retraining  Safety/Judgement: Awareness of environment; Fall prevention    Pain:  Reporting soreness in neck - RN aware to bring medication    Activity Tolerance:   Good    After treatment patient left in no apparent distress:   Supine in bed, Call bell within reach, Caregiver / family present, Side rails x 3, and RN aware    COMMUNICATION/COLLABORATION:   The patients plan of care was discussed with: Physical therapist and Registered nurse.      Goldy Jimenez OT  Time Calculation: 20 mins

## 2021-04-20 VITALS
SYSTOLIC BLOOD PRESSURE: 158 MMHG | HEART RATE: 70 BPM | BODY MASS INDEX: 35.14 KG/M2 | RESPIRATION RATE: 19 BRPM | WEIGHT: 179 LBS | HEIGHT: 60 IN | DIASTOLIC BLOOD PRESSURE: 77 MMHG | TEMPERATURE: 98.2 F | OXYGEN SATURATION: 98 %

## 2021-04-20 PROBLEM — I61.9 ICH (INTRACEREBRAL HEMORRHAGE) (HCC): Status: RESOLVED | Noted: 2021-04-16 | Resolved: 2021-04-20

## 2021-04-20 LAB
GLUCOSE BLD STRIP.AUTO-MCNC: 183 MG/DL (ref 65–100)
GLUCOSE BLD STRIP.AUTO-MCNC: 196 MG/DL (ref 65–100)
SERVICE CMNT-IMP: ABNORMAL
SERVICE CMNT-IMP: ABNORMAL

## 2021-04-20 PROCEDURE — 74011250637 HC RX REV CODE- 250/637: Performed by: NURSE PRACTITIONER

## 2021-04-20 PROCEDURE — 74011250637 HC RX REV CODE- 250/637: Performed by: STUDENT IN AN ORGANIZED HEALTH CARE EDUCATION/TRAINING PROGRAM

## 2021-04-20 PROCEDURE — 74011636637 HC RX REV CODE- 636/637: Performed by: NURSE PRACTITIONER

## 2021-04-20 PROCEDURE — 82962 GLUCOSE BLOOD TEST: CPT

## 2021-04-20 PROCEDURE — 74011250637 HC RX REV CODE- 250/637: Performed by: INTERNAL MEDICINE

## 2021-04-20 RX ORDER — AMLODIPINE BESYLATE 5 MG/1
5 TABLET ORAL ONCE
Status: COMPLETED | OUTPATIENT
Start: 2021-04-20 | End: 2021-04-20

## 2021-04-20 RX ORDER — LABETALOL 200 MG/1
200 TABLET, FILM COATED ORAL 2 TIMES DAILY
Qty: 60 TAB | Refills: 0 | Status: SHIPPED | OUTPATIENT
Start: 2021-04-20

## 2021-04-20 RX ORDER — AMLODIPINE BESYLATE 10 MG/1
10 TABLET ORAL DAILY
Qty: 30 TAB | Refills: 0 | Status: SHIPPED | OUTPATIENT
Start: 2021-04-21

## 2021-04-20 RX ORDER — LABETALOL 100 MG/1
100 TABLET, FILM COATED ORAL ONCE
Status: COMPLETED | OUTPATIENT
Start: 2021-04-20 | End: 2021-04-20

## 2021-04-20 RX ORDER — AMLODIPINE BESYLATE 5 MG/1
10 TABLET ORAL DAILY
Status: DISCONTINUED | OUTPATIENT
Start: 2021-04-21 | End: 2021-04-20 | Stop reason: HOSPADM

## 2021-04-20 RX ORDER — LABETALOL 200 MG/1
200 TABLET, FILM COATED ORAL 2 TIMES DAILY
Status: DISCONTINUED | OUTPATIENT
Start: 2021-04-20 | End: 2021-04-20 | Stop reason: HOSPADM

## 2021-04-20 RX ORDER — ATORVASTATIN CALCIUM 40 MG/1
40 TABLET, FILM COATED ORAL DAILY
Qty: 30 TAB | Refills: 0 | Status: SHIPPED | OUTPATIENT
Start: 2021-04-21 | End: 2022-02-23

## 2021-04-20 RX ADMIN — LOSARTAN POTASSIUM 50 MG: 50 TABLET, FILM COATED ORAL at 08:36

## 2021-04-20 RX ADMIN — Medication 10 ML: at 05:52

## 2021-04-20 RX ADMIN — ATORVASTATIN CALCIUM 40 MG: 40 TABLET, FILM COATED ORAL at 08:36

## 2021-04-20 RX ADMIN — PANTOPRAZOLE SODIUM 40 MG: 40 TABLET, DELAYED RELEASE ORAL at 08:35

## 2021-04-20 RX ADMIN — INSULIN LISPRO 2 UNITS: 100 INJECTION, SOLUTION INTRAVENOUS; SUBCUTANEOUS at 11:18

## 2021-04-20 RX ADMIN — AMLODIPINE BESYLATE 5 MG: 5 TABLET ORAL at 10:20

## 2021-04-20 RX ADMIN — INSULIN LISPRO 2 UNITS: 100 INJECTION, SOLUTION INTRAVENOUS; SUBCUTANEOUS at 08:34

## 2021-04-20 RX ADMIN — LABETALOL HYDROCHLORIDE 100 MG: 100 TABLET, FILM COATED ORAL at 10:20

## 2021-04-20 RX ADMIN — ACETAMINOPHEN 650 MG: 325 TABLET, FILM COATED ORAL at 07:13

## 2021-04-20 RX ADMIN — LABETALOL HYDROCHLORIDE 100 MG: 100 TABLET, FILM COATED ORAL at 08:36

## 2021-04-20 RX ADMIN — INSULIN GLARGINE 30 UNITS: 100 INJECTION, SOLUTION SUBCUTANEOUS at 08:34

## 2021-04-20 RX ADMIN — AMLODIPINE BESYLATE 5 MG: 5 TABLET ORAL at 08:36

## 2021-04-20 NOTE — DISCHARGE SUMMARY
Discharge Summary       PATIENT ID: Alesha Gomes  MRN: [id number]   YOB: 1965    DATE OF ADMISSION: 4/16/2021  4:35 AM    DATE OF DISCHARGE: 4/20/2021    PRIMARY CARE PROVIDER: Landen Pop NP     ATTENDING PHYSICIAN: Prieto Will MD   DISCHARGING PROVIDER: Prieto Will MD    To contact this individual call [phone] and ask the  to page. If unavailable ask to be transferred the Adult Hospitalist Department. CONSULTATIONS: IP CONSULT TO NEUROSURGERY    PROCEDURES/SURGERIES: * No surgery found *    ADMITTING DIAGNOSES & HOSPITAL COURSE:   Per notes:            Admission Summary:   HPI:Alesha Farley is a 61 y. o. female with a past history of DMII, CVA with no residual effects, HTN, hyperlipidemia, recent ear infection and smoking. States that Wednesday night while she was watching television she began having numbness of her nose and area around her mouth. She started having tingling in her fingers bilaterally. Generalized weakness gradually ensued and she could not stand. She also began having blurred and double vision that has been going on for the last 24 hours. Patient had a stroke in the past so she decided she to get evaluated in the ER. Patient had an elevated BP on arrival of 190/83. She was taken for [facility] which showed acute andressa hemorrhage 1 x 2 x 1.5 cm. No hydrocephalus. Dr. Devora Singh with [facility] was consulted. No neurosurgical intervention is indicated at this time. She was transferred to [facility] ER for a higher level of care where she is awaiting an ICU bed.          Interval history / Subjective:      4/19/2021 :   · Improved day over day, less geeta numbness hand/face   · Else as below          Assessment & Plan:      1. Pontine Hemorrhage ; recoving, less geeta hand/mid face numbness. 2. Hypertension - contorled goals 120 - 160 sys t  3. Type 2 DM - SSI , coverage  4. Hyperlipidemia ; statin  5. Cocaine use -discussed  6. Tobacco abuse-discussed        DISCHARGE DIAGNOSES / PLAN:      1.  as above      ADDITIONAL CARE RECOMMENDATIONS:   Na     PENDING TEST RESULTS:   At the time of discharge the following test results are still pending: na    FOLLOW UP APPOINTMENTS:    Follow-up Information     Follow up With Specialties Details [address] [place] [facility] [place] Service Board   Your local [facility] Service Board for FREE resources on substance abuse and sobriety support [address] [facility] Abbi Phillips 7    Same Day [address] [facility] 19   Phone number for questions about same day access process for free resources for [place] [address] substance abuse resources P: [phone] [facility] 9A-1P for resources to substance abuse support [facility] Camp[facility]arshThe [facility] Hospitalcooperwn: [phone]    Domenic Carson, NP Nurse Practitioner In 1 week close attention to blood pressure, conseling on drugs of abuse  [address] [facility]  [phone]                    ACTIVITY: Activity as tolerated    WOUND CARE: na    EQUIPMENT needed: na      DISCHARGE MEDICATIONS:  Current Discharge Medication List      START taking these medications    Details   atorvastatin (LIPITOR) 40 mg tablet Take 1 Tab by mouth daily.   Qty: 30 Tab, Refills: 0      OTHER Standard walker   Dx acute intracranial bleed  Qty: 1 Device, Refills: 0         CONTINUE these medications which have NOT CHANGED    Details   ofloxacin (FLOXIN) 0.3 % ophthalmic solution instill 10 DROPS into THE affected ear ONCE A DAY FOR 7 DAYS      insulin glargine (Lantus Solostar U-100 Insulin) 100 unit/mL (3 mL) inpn 30 units every morning  Qty: 3 Adjustable Dose Pre-filled Pen Syringe, Refills: 3    Associated Diagnoses: Type 2 diabetes mellitus with hyperglycemia, with long-term current use of insulin ([facility])      Insulin Needles, Disposable, (BD Ultra-Fine Short Pen Needle) 31 gauge x 5/16\" ndle Once a day  Qty: 1 Package, Refills: 3    Associated Diagnoses: Type 2 diabetes mellitus with hyperglycemia, with long-term current use of insulin ([facility])      glucose blood VI test strips (True Metrix Glucose Test Strip) strip by Does Not Apply route See Admin Instructions. Twice a day  Qty: 100 Strip, Refills: 3    Associated Diagnoses: Type 2 diabetes mellitus with hyperglycemia, with long-term current use of insulin ([facility])      lancets (Ultra-Care Lancets) 30 gauge misc Twice a day  Qty: 100 Lancet, Refills: 3    Associated Diagnoses: Type 2 diabetes mellitus with hyperglycemia, with long-term current use of insulin ([facility])      acetaminophen (TYLENOL) 500 mg tablet Take  by mouth every six (6) hours as needed for Pain. alcohol swabs (Alcohol Pads) padm by Apply Externally route. amLODIPine (NORVASC) 5 mg tablet Take 5 mg by mouth daily. cyanocobalamin (VITAMIN B-12) 1,000 mcg sublingual tablet Take 1,000 mcg by mouth daily. irbesartan (AVAPRO) 150 mg tablet Take 150 mg by mouth nightly. labetaloL (NORMODYNE) 100 mg tablet Take  by mouth two (2) times a day. Lancing Device misc by Does Not Apply route. latanoprost (XALATAN) 0.005 % ophthalmic solution Administer 1 Drop to both eyes nightly. meclizine (ANTIVERT) 25 mg tablet Take  by mouth three (3) times daily as needed for Dizziness. omeprazole (PRILOSEC) 20 mg capsule Take 20 mg by mouth daily. STOP taking these medications       ibuprofen (MOTRIN) 800 mg tablet Comments:   Reason for Stopping:         aspirin delayed-release 81 mg tablet Comments:   Reason for Stopping:                 NOTIFY YOUR PHYSICIAN FOR ANY OF THE FOLLOWING:   Fever over 101 degrees for 24 hours. Chest pain, shortness of breath, fever, chills, nausea, vomiting, diarrhea, change in mentation, falling, weakness, bleeding. Severe pain or pain not relieved by medications.   Or, any other signs or symptoms that you may have questions about.    DISPOSITION:    Home With:   OT  PT  HH  RN       Long term SNF/Inpatient Rehab   x Independent/assisted living    Hospice    Other:       PATIENT CONDITION AT DISCHARGE:     Functional status    Poor     Deconditioned    x Independent      Cognition   x  Lucid     Forgetful     Dementia      Catheters/lines (plus indication)    Grewal     PICC     PEG    x None      Code status    x Full code     DNR      PHYSICAL EXAMINATION AT DISCHARGE:  Visit Vitals  BP (!) 166/85   Pulse 63   Temp 98.4 °F (36.9 °C)   Resp 19   Ht 5' (1.524 m)   Wt 81.2 kg (179 lb)   SpO2 97%   BMI 34.96 kg/m²          CHRONIC MEDICAL DIAGNOSES:  Problem List as of 4/20/2021 Date Reviewed: 4/20/2021          Codes Class Noted - Resolved    Type 2 diabetes mellitus with hyperglycemia, with long-term current use of insulin (HCC) ICD-10-CM: E11.65, Z79.4  ICD-9-CM: 250.00, 790.29, V58.67  3/11/2021 - Present        Noncompliance with medication regimen ICD-10-CM: Z91.14  ICD-9-CM: V15.81  3/11/2021 - Present        Type II diabetes mellitus, uncontrolled ([facility] ICD-10-CM: E11.65  ICD-9-CM: 250.02  7/29/2020 - Present        Hyperlipidemia ICD-10-CM: E78.5  ICD-9-CM: 272.4  7/29/2020 - Present        Hypertensive disorder ICD-10-CM: I10  ICD-9-CM: 401.9  7/29/2020 - Present        History of recent dental procedure ICD-10-CM: Z98.890  ICD-9-CM: V15.29  7/29/2020 - Present        RESOLVED: [facility] ([facility] ICD-10-CM: I61.9  ICD-9-CM: 384  4/16/2021 - 4/20/2021              Greater than 20  minutes were spent with the patient on counseling and coordination of care    Signed:   Rodolfo Zuñiga MD  4/20/2021  9:19 AM

## 2021-04-20 NOTE — DISCHARGE INSTRUCTIONS
Discharge Instructions       PATIENT ID: April R Rachel Reasoner  MRN: [id number]   YOB: 1965    DATE OF ADMISSION: 4/16/2021  4:35 AM    DATE OF DISCHARGE: 4/20/2021    PRIMARY CARE PROVIDER: Gia Armas NP     ATTENDING PHYSICIAN: Laura Mendez MD  DISCHARGING PROVIDER: Raina West MD    To contact this individual call [phone] and ask the  to page. If unavailable ask to be transferred the Adult Hospitalist Department. DISCHARGE DIAGNOSES see dc note     CONSULTATIONS: IP CONSULT TO NEUROSURGERY    PROCEDURES/SURGERIES: * No surgery found *    PENDING TEST RESULTS:   At the time of discharge the following test results are still pending: na    FOLLOW UP APPOINTMENTS:   Follow-up Information     Follow up With Specialties Details [address] [place] [facility] [place] Service Board   Your local Community Service Board for FREE resources on substance abuse and sobriety support [address] [facility] JacquelineKervinmadhurijonydeb 7    Same Day [address] [facility] 19   Phone number for questions about same day access process for free resources for [place] [address] substance abuse resources P: [phone] [facility] 9A-1P for resources to substance abuse support [facility] Hospitalwn: [phone]    Gia Armas NP Nurse Practitioner In 1 week close attention to blood pressure, conseling on drugs of abuse  [address] [facility]  [phone]             ADDITIONAL CARE RECOMMENDATIONS: na    DIET: Cardiac Diet and Diabetic Diet     ACTIVITY: Activity as tolerated    WOUND CARE: na    EQUIPMENT needed: na             DISCHARGE MEDICATIONS:   See Medication Reconciliation Form    · It is important that you take the medication exactly as they are prescribed.    · Keep your medication in the bottles provided by the pharmacist and keep a list of the medication names, dosages, and times to be taken in your wallet. · Do not take other medications without consulting your doctor. NOTIFY YOUR PHYSICIAN FOR ANY OF THE FOLLOWING:   Fever over 101 degrees for 24 hours. Chest pain, shortness of breath, fever, chills, nausea, vomiting, diarrhea, change in mentation, falling, weakness, bleeding. Severe pain or pain not relieved by medications. Or, any other signs or symptoms that you may have questions about.       DISPOSITION:    Home With:   OT  PT  HH  RN       SNF/Inpatient Rehab/LTAC   x Independent/assisted living    Hospice    Other:          Signed:   Marcin Gonzalez MD  4/20/2021  9:18 AM

## 2021-04-20 NOTE — PROGRESS NOTES
Problem: Falls - Risk of  Goal: *Absence of Falls  Description: Document Josue Kwon Fall Risk and appropriate interventions in the flowsheet.   Outcome: Progressing Towards Goal  Note: Fall Risk Interventions:  Mobility Interventions: Bed/chair exit alarm, Patient to call before getting OOB         Medication Interventions: Bed/chair exit alarm, Evaluate medications/consider consulting pharmacy    Elimination Interventions: Bed/chair exit alarm

## 2021-04-20 NOTE — PROGRESS NOTES
Hospital follow-up PCP transitional care appointment has been scheduled with Dr. Serenity Brandt, NP for Tuesday, 3/27/21 at 3:15 p.m. Pending patient discharge.   Tony Valentino, Care Management Specialist.

## 2021-04-20 NOTE — PROGRESS NOTES
Problem: Falls - Risk of  Goal: *Absence of Falls  Description: Document Ladi Gannon Fall Risk and appropriate interventions in the flowsheet.   Outcome: Progressing Towards Goal  Note: Fall Risk Interventions:  Mobility Interventions: Bed/chair exit alarm, Patient to call before getting OOB         Medication Interventions: Bed/chair exit alarm, Evaluate medications/consider consulting pharmacy    Elimination Interventions: Bed/chair exit alarm

## 2021-04-20 NOTE — PROGRESS NOTES
Transition of Care Plan   RUR- 11% Low Risk   DISPOSITION: The disposition plan is home with family assistance - encouraged to contact PCP to follow up with HHPT/OT.  Transport: Family    CM followed up with over 10 agencies regarding HHPT/OT. Patient is encouraged to follow up with PCP for assistance regarding connection to HHPT/OT. CM informed patient at bedside. CM will continue to follow, provide support and assist with CINDY needs as they arise.     Loli Carcamo

## 2021-04-20 NOTE — PROGRESS NOTES
Pharmacist Discharge Medication Reconciliation    Discharging Provider: Dr. Mario Mansfield PMH:   Past Medical History:   Diagnosis Date    History of recent dental procedure 7/29/2020    Hyperlipidemia 7/29/2020    Hypertensive disorder 7/29/2020    Murmur     Stroke [facility])     Type II diabetes mellitus, uncontrolled ([facility] 7/29/2020     Chief Complaint for this Admission:   Chief Complaint   Patient presents with    Numbness    Hypertension     Allergies: Penicillins and Percocet [oxycodone-acetaminophen]    Discharge Medications:   Current Discharge Medication List        START taking these medications    Details   atorvastatin (LIPITOR) 40 mg tablet Take 1 Tab by mouth daily. Qty: 30 Tab, Refills: 0      OTHER Standard walker   Dx acute intracranial bleed  Qty: 1 Device, Refills: 0           CONTINUE these medications which have CHANGED    Details   amLODIPine (NORVASC) 10 mg tablet Take 1 Tab by mouth daily. Qty: 30 Tab, Refills: 0      labetaloL (NORMODYNE) 200 mg tablet Take 1 Tab by mouth two (2) times a day. Indications: high blood pressure  Qty: 60 Tab, Refills: 0           CONTINUE these medications which have NOT CHANGED    Details   ofloxacin (FLOXIN) 0.3 % ophthalmic solution instill 10 DROPS into THE affected ear ONCE A DAY FOR 7 DAYS      insulin glargine (Lantus Solostar U-100 Insulin) 100 unit/mL (3 mL) inpn 30 units every morning  Qty: 3 Adjustable Dose Pre-filled Pen Syringe, Refills: 3    Associated Diagnoses: Type 2 diabetes mellitus with hyperglycemia, with long-term current use of insulin ([facility])      Insulin Needles, Disposable, (BD Ultra-Fine Short Pen Needle) 31 gauge x 5/16\" ndle Once a day  Qty: 1 Package, Refills: 3    Associated Diagnoses: Type 2 diabetes mellitus with hyperglycemia, with long-term current use of insulin ([facility])      glucose blood VI test strips (True Metrix Glucose Test Strip) strip by Does Not Apply route See Admin Instructions.  Twice a day  Qty: 100 Strip, Refills: 3    Associated Diagnoses: Type 2 diabetes mellitus with hyperglycemia, with long-term current use of insulin (HCC)      lancets (Ultra-Care Lancets) 30 gauge misc Twice a day  Qty: 100 Lancet, Refills: 3    Associated Diagnoses: Type 2 diabetes mellitus with hyperglycemia, with long-term current use of insulin (HCC)      acetaminophen (TYLENOL) 500 mg tablet Take  by mouth every six (6) hours as needed for Pain. alcohol swabs (Alcohol Pads) padm by Apply Externally route. cyanocobalamin (VITAMIN B-12) 1,000 mcg sublingual tablet Take 1,000 mcg by mouth daily. irbesartan (AVAPRO) 150 mg tablet Take 150 mg by mouth nightly. Lancing Device misc by Does Not Apply route. latanoprost (XALATAN) 0.005 % ophthalmic solution Administer 1 Drop to both eyes nightly. meclizine (ANTIVERT) 25 mg tablet Take  by mouth three (3) times daily as needed for Dizziness. omeprazole (PRILOSEC) 20 mg capsule Take 20 mg by mouth daily. STOP taking these medications       ibuprofen (MOTRIN) 800 mg tablet Comments:   Reason for Stopping:         aspirin delayed-release 81 mg tablet Comments:   Reason for Stopping:               The patient's chart, MAR and AVS were reviewed by Zion De Los Santos.

## 2021-04-20 NOTE — PROGRESS NOTES
I have reviewed discharge instructions with the patient and patient's daughter. The patient and patient's daughter verbalized understanding. PIV and telemetry discontinued. Patient discharged to home via family with prescriptions and belongings.

## 2021-04-22 ENCOUNTER — TELEPHONE (OUTPATIENT)
Dept: ENDOCRINOLOGY | Age: 56
End: 2021-04-22

## 2021-04-22 DIAGNOSIS — Z79.4 TYPE 2 DIABETES MELLITUS WITH HYPERGLYCEMIA, WITH LONG-TERM CURRENT USE OF INSULIN (HCC): Primary | ICD-10-CM

## 2021-04-22 DIAGNOSIS — E11.65 TYPE 2 DIABETES MELLITUS WITH HYPERGLYCEMIA, WITH LONG-TERM CURRENT USE OF INSULIN (HCC): Primary | ICD-10-CM

## 2021-04-22 NOTE — TELEPHONE ENCOUNTER
Spoke with patient to reschedule her appointment when she stated that she has ran out of her Pioglitazone (Actos) 40mg and she needs a new prescription for it

## 2021-04-23 RX ORDER — PIOGLITAZONEHYDROCHLORIDE 45 MG/1
45 TABLET ORAL DAILY
Qty: 30 TAB | Refills: 3 | Status: SHIPPED | OUTPATIENT
Start: 2021-04-23 | End: 2021-05-13 | Stop reason: SDUPTHER

## 2021-05-13 ENCOUNTER — OFFICE VISIT (OUTPATIENT)
Dept: ENDOCRINOLOGY | Age: 56
End: 2021-05-13
Payer: MEDICAID

## 2021-05-13 VITALS
TEMPERATURE: 97.8 F | WEIGHT: 182.5 LBS | SYSTOLIC BLOOD PRESSURE: 125 MMHG | HEIGHT: 60 IN | BODY MASS INDEX: 35.83 KG/M2 | DIASTOLIC BLOOD PRESSURE: 79 MMHG | HEART RATE: 76 BPM | OXYGEN SATURATION: 99 %

## 2021-05-13 DIAGNOSIS — Z79.4 TYPE 2 DIABETES MELLITUS WITH HYPERGLYCEMIA, WITH LONG-TERM CURRENT USE OF INSULIN (HCC): Primary | ICD-10-CM

## 2021-05-13 DIAGNOSIS — E11.65 TYPE 2 DIABETES MELLITUS WITH HYPERGLYCEMIA, WITH LONG-TERM CURRENT USE OF INSULIN (HCC): Primary | ICD-10-CM

## 2021-05-13 PROBLEM — F14.10 COCAINE ABUSE (HCC): Status: ACTIVE | Noted: 2021-05-13

## 2021-05-13 PROBLEM — I61.3 PONTINE HEMORRHAGE (HCC): Status: ACTIVE | Noted: 2021-05-13

## 2021-05-13 LAB — GLUCOSE POC: 159 MG/DL

## 2021-05-13 PROCEDURE — 99214 OFFICE O/P EST MOD 30 MIN: CPT | Performed by: INTERNAL MEDICINE

## 2021-05-13 PROCEDURE — 82962 GLUCOSE BLOOD TEST: CPT | Performed by: INTERNAL MEDICINE

## 2021-05-13 RX ORDER — PEN NEEDLE, DIABETIC 30 GX3/16"
NEEDLE, DISPOSABLE MISCELLANEOUS
Qty: 1 PACKAGE | Refills: 3 | Status: SHIPPED | OUTPATIENT
Start: 2021-05-13 | End: 2022-02-23 | Stop reason: SDUPTHER

## 2021-05-13 RX ORDER — SITAGLIPTIN 100 MG/1
100 TABLET, FILM COATED ORAL DAILY
Qty: 30 TAB | Refills: 3 | Status: SHIPPED | OUTPATIENT
Start: 2021-05-13 | End: 2021-06-12

## 2021-05-13 RX ORDER — SITAGLIPTIN 100 MG/1
TABLET, FILM COATED ORAL
COMMUNITY
Start: 2021-04-22 | End: 2021-05-13 | Stop reason: SDUPTHER

## 2021-05-13 RX ORDER — ERGOCALCIFEROL 1.25 MG/1
CAPSULE ORAL
COMMUNITY
Start: 2021-04-21

## 2021-05-13 RX ORDER — PIOGLITAZONEHYDROCHLORIDE 45 MG/1
45 TABLET ORAL DAILY
Qty: 30 TAB | Refills: 3 | Status: SHIPPED | OUTPATIENT
Start: 2021-05-13 | End: 2022-01-24 | Stop reason: SDUPTHER

## 2021-05-13 RX ORDER — INSULIN GLARGINE 100 [IU]/ML
INJECTION, SOLUTION SUBCUTANEOUS
Qty: 3 ADJUSTABLE DOSE PRE-FILLED PEN SYRINGE | Refills: 3 | Status: SHIPPED | OUTPATIENT
Start: 2021-05-13 | End: 2022-01-24 | Stop reason: SDUPTHER

## 2021-05-13 NOTE — PROGRESS NOTES
History and Physical    Patient: April R Lamona Lennox MRN: [id number]  SSN: [id number]    YOB: 1965  Age: 61 y.o. Sex: female      Subjective:      April R Lamona Lennox is a 61 y.o. female with past medical history of hypertension, hyperlipidemia, CVA is here for follow-up of type 2 diabetes mellitus. She was ent to me by primary care provider Barry Barakat NP. 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 She was found to have cocaine positive in her tox screen. She still has residual issues with balance while walking as well as numbness around her nose and mouth. It appears that patient is trying to get compliant with medications and diet since then. She is taking Lantus 30 units in the morning, Januvia 100 mg daily in the morning, she was started on pioglitazone 45 mg daily. Previously she had issues with snacking continuously all day and night, but since she had stroke she has stopped eating all the snacks, she is not drinking sugary beverages also. Currently checking blood glucose 4 times a day and closely monitoring her blood pressure at home. Previously she was on amlodipine labetalol and irbesartan but currently she is only on amlodipine and labetalol, not sure why it restarted and was not refilled but her blood pressure has been running normal on just these 2 medications. She had diabetic eye exam done a few days back and she was told that there are some white spots in her retina and she was referred to retina specialist.  She has appointment for this coming up soon. Glucometer reading: Checking blood glucose 3-4 times per day.   Readings are ranging from 104 to 323 mg/dL    Updated diabetes history:  · Diagnosis: 8 years    · Current treatment: Lantus 30 units in the morning, Januvia 100 mg daily, pioglitazone 45 mg daily    · Past treatment: metformin ER (constipation and hemorrhoids flaring up), Victoza (decreased appetite that patient doesn't llike)    · Glucose checks: once a day, 200-300s    · Hyperglycemia: yes    · Hypoglycemia: no    · Meals per day: 3, breakfast: sausage, and biscuit, egg, lunch: turkey sandwich, or lunch meat, etc sandwich, dinner: same as lunch: snacks: snacks all day and night, junk food, candy bars, cereal, fruit, sweet tea,     · Exercise: walks    · DM related hospitalizations: no        Complications of DM:    · CAD: no    · CVA: yes, ,     · PVD: no    · Amputations: no     · Retinopathy: no; last exam was May 2021    · Gastropathy: no    · Nephropathy: no    · Neuropathy: yes        Medications:    · Statin: Atorvastatin 40 mg    · ACE-I: no    · ASA: No, stopped after she had pontine hemorrhage      · Diabetes education: no    Past Medical History:   Diagnosis Date    History of recent dental procedure 2020    Hyperlipidemia 2020    Hypertensive disorder 2020    Murmur     [facility]roke [facility])     Type II diabetes mellitus, uncontrolled ([facility] [address] 2020     Past Surgical History:   Procedure Laterality Date    HX APPENDECTOMY      HX  SECTION      HX ORTHOPAEDIC        Family History   Problem Relation Age of Onset    Diabetes Mother     Diabetes Paternal Grandmother      Social History     Tobacco Use    Smoking status: Current Every Day Smoker     Packs/day: 0.50    Smokeless tobacco: Never Used   Substance Use Topics    Alcohol use: Yes     Comment: daily      Prior to Admission medications    Medication Sig Start Date End Date Taking? Authorizing Provider   ergocalciferol (ERGOCALCIFEROL) 1,250 mcg (50,000 unit) capsule take one capsule by mouth once a week 21  Yes Provider, Historical   pioglitazone (ACTOS) 45 mg tablet Take 1 Tab by mouth daily for 30 days. 21 Yes Ailyn Orr MD   Januvia 100 mg tablet Take 1 Tab by mouth daily for 30 days.  21 Yes Ailyn Orr MD   insulin glargine (Lantus Solostar U-100 Insulin) 100 unit/mL (3 mL) inpn 30 units every morning 5/13/21  Yes Ailyn Orr MD   Insulin Needles, Disposable, (BD Ultra-Fine Short Pen Needle) 31 gauge x 5/16\" ndle Once a day 5/13/21  Yes Ailyn Orr MD   atorvastatin (LIPITOR) 40 mg tablet Take 1 Tab by mouth daily. 4/21/21  Yes Sameera Harvey MD   amLODIPine (NORVASC) 10 mg tablet Take 1 Tab by mouth daily. 4/21/21  Yes Sameera Harvey MD   labetaloL (NORMODYNE) 200 mg tablet Take 1 Tab by mouth two (2) times a day. Indications: high blood pressure 4/20/21  Yes Sameera Harvey MD   ofloxacin (FLOXIN) 0.3 % ophthalmic solution instill 10 DROPS into THE affected ear ONCE A DAY FOR 7 DAYS 2/9/21  Yes Provider, Historical   glucose blood VI test strips (True Metrix Glucose Test Strip) strip by Does Not Apply route See Admin Instructions. Twice a day 3/11/21  Yes Ailyn Orr MD   lancets (Ultra-Care Lancets) 30 gauge misc Twice a day 3/11/21  Yes Ailyn Orr MD   acetaminophen (TYLENOL) 500 mg tablet Take  by mouth every six (6) hours as needed for Pain. Yes Provider, Historical   alcohol swabs (Alcohol Pads) padm by Apply Externally route. Yes Provider, Historical   cyanocobalamin (VITAMIN B-12) 1,000 mcg sublingual tablet Take 1,000 mcg by mouth daily. Yes Provider, Historical   irbesartan (AVAPRO) 150 mg tablet Take 150 mg by mouth nightly. Yes Provider, Historical   Lancing Device misc by Does Not Apply route. Yes Provider, Historical   latanoprost (XALATAN) 0.005 % ophthalmic solution Administer 1 Drop to both eyes nightly. Yes Provider, Historical   meclizine (ANTIVERT) 25 mg tablet Take  by mouth three (3) times daily as needed for Dizziness. Yes Provider, Historical   omeprazole (PRILOSEC) 20 mg capsule Take 20 mg by mouth daily.    Yes Provider, Historical        Allergies   Allergen Reactions    Atorvastatin Calcium Unknown (comments)    Codeine Unknown (comments)    Oxycodone Hcl Itching    Penicillins Other (comments)     Yeast Infection    Percocet [Oxycodone-Acetaminophen] Hives  Propoxyphene Hcl Unknown (comments)       Review of Systems:  ROS    A comprehensive review of systems was preformed and it is negative except mentioned in HPI    Objective:     Vitals:    05/13/21 1309 05/13/21 1317   BP: (!) 147/84 125/79   Pulse: 76 76   Temp: 97.8 °F (36.6 °C)    TempSrc: Temporal    SpO2: 99%    Weight: 182 lb 8 oz (82.8 kg)    Height: 5' (1.524 m)         Physical Exam:    Physical Exam  Vitals signs and nursing note reviewed. Constitutional:       Appearance: Normal appearance. HENT:      Head: Normocephalic and atraumatic. Cardiovascular:      Rate and Rhythm: Normal rate and regular rhythm. Pulmonary:      Effort: Pulmonary effort is normal.      Breath sounds: Normal breath sounds. Neurological:      Mental Status: She is alert. 3-:   diabetic foot exam:  Bilateral diabetic foot exam was performed today. Dorsalis pedis pulses 2+ bilaterally. Monofilament sensation normal bilaterally. No ulcers or skin breakdown. Labs and Imaging:  Results for Alejandro Plasencia (MRN [id number]) as of 5/13/2021 13:22   Ref. Range 3/11/2021 11:32   Sodium Latest Ref Range: 134 - 144 mmol/L 136   Potassium Latest Ref Range: 3.5 - 5.2 mmol/L 4.5   Chloride Latest Ref Range: 96 - 106 mmol/L 97   CO2 Latest Ref Range: 20 - 29 mmol/L 25   Glucose Latest Ref Range: 65 - 99 mg/dL 317 (H)   BUN Latest Ref Range: 6 - 24 mg/dL 11   Creatinine Latest Ref Range: 0.57 - 1.00 mg/dL 0.99   BUN/Creatinine ratio Latest Ref Range: 9 - 23  11   Calcium Latest Ref Range: 8.7 - 10.2 mg/dL 9.6   GFR est non-AA Latest Ref Range: >59 mL/min/1.73 63   GFR est AA Latest Ref Range: >59 mL/min/1.73 73   Bilirubin, total Latest Ref Range: 0.0 - 1.2 mg/dL 0.2   Protein, total Latest Ref Range: 6.0 - 8.5 g/dL 7.0   Albumin Latest Ref Range: 3.8 - 4.9 g/dL 4.0   A-G Ratio Latest Ref Range: 1.2 - 2.2  1.3   ALT Latest Ref Range: 0 - 32 IU/L 26   AST Latest Ref Range: 0 - 40 IU/L 17   Alk.  phosphatase Latest Ref Range: 39 - 117 IU/L 128 (H)   Results for Vinod Dub (MRN [id number]) as of 5/13/2021 13:22   Ref. Range 3/11/2021 11:32   Triglyceride Latest Ref Range: 0 - 149 mg/dL 119   Cholesterol, total Latest Ref Range: 100 - 199 mg/dL 219 (H)   HDL Cholesterol Latest Ref Range: >39 mg/dL 44   VLDL, calculated Latest Ref Range: 5 - 40 mg/dL 21   LDL, calculated Latest Ref Range: 0 - 99 mg/dL 154 (H)   Results for Vinod Dub (MRN [id number]) as of 5/13/2021 13:22   Ref. Range 3/11/2021 11:32   Creatinine, urine Latest Ref Range: Not Estab. mg/dL 167.6   Microalbumin, urine Latest Ref Range: Not Estab. ug/mL 10.2   Microalbumin/Creat. Ratio Latest Ref Range: 0 - 29 mg/g creat 6   Results for Vinod Dub (MRN [id number]) as of 5/13/2021 13:22   Ref. Range 3/11/2021 11:32   TSH Latest Ref Range: 0.450 - 4.500 uIU/mL 0.952     Last 3 Recorded Weights in this Encounter    05/13/21 1309   Weight: 182 lb 8 oz (82.8 kg)        Lab Results   Component Value Date/Time    Hemoglobin A1c 12.4 (H) 04/17/2021 05:33 AM        Assessment:     Patient Active Problem List   Diagnosis Code    Type II diabetes mellitus, uncontrolled ([facility] [address] E11.65    Hyperlipidemia E78.5    Hypertensive disorder I10    History of recent dental procedure Z98.890    Type 2 diabetes mellitus with hyperglycemia, with long-term current use of insulin (HCC) E11.65, Z79.4    Noncompliance with medication regimen Z91.14    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 Fingerstick blood glucose is 159 mg/dL in my office today. Up to date with diabetes related annual labs: 3-2021    Up to date with diabetic eye exam: 5-2021    Plan:  There is a lot of improvement in patient's glucose control. Encourage patient to continue with dietary changes.   Continue Lantus 30 units in the morning, Januvia 100 mg daily, pioglitazone 45 mg daily. Check blood glucose 2 times a day and I will see her back in 6 weeks. essential hypertension  Blood pressure well controlled on current medications. No microalbuminuria last checked in 2021. mixed hyperlipidemia  Previously on atorvastatin 40 mg, which was switched to pravastatin 20 mg by PCP. However, patient has hair fall from pravastatin. 3-: Total cholesterol 219, triglycerides 119, . Switched back to atorvastatin 40 mg daily. She is taking this in the morning, advised patient to take this at bedtime. I will recheck lipid profile in a few months. Substance abuse  Tox screen positive for cocaine in 2021 when patient was found to have pontine hemorrhage    History of CVA  Old with no residual deficits  Pontine hemorrhage in 2021    Noncompliance with medication regimen    Orders Placed This Encounter    AMB POC GLUCOSE BLOOD, BY GLUCOSE MONITORING DEVICE    pioglitazone (ACTOS) 45 mg tablet     Sig: Take 1 Tab by mouth daily for 30 days. Dispense:  30 Tab     Refill:  3    Januvia 100 mg tablet     Sig: Take 1 Tab by mouth daily for 30 days.      Dispense:  30 Tab     Refill:  3    insulin glargine (Lantus Solostar U-100 Insulin) 100 unit/mL (3 mL) inpn     Si units every morning     Dispense:  3 Adjustable Dose Pre-filled Pen Syringe     Refill:  3    Insulin Needles, Disposable, (BD Ultra-Fine Short Pen Needle) 31 gauge x 5/16\" ndle     Sig: Once a day     Dispense:  1 Package     Refill:  3        Signed By: Usman Serna MD     May 13, 2021      Return to clinic 6 weeks

## 2021-05-13 NOTE — LETTER
5/13/2021 Patient: Arin Marroquin YOB: 1965 Date of Visit: 5/13/2021 Latasha Cedeno MD 
[address] [facility] [address] [facility] [address] Via Fax: [phone] Dear Latasha Cedeno MD, Thank you for referring Ms. Alesha Farley to [address] [place] [address] for evaluation. My notes for this consultation are attached. If you have questions, please do not hesitate to call me. I look forward to following your patient along with you. Sincerely, Rob Parks MD

## 2021-05-13 NOTE — PATIENT INSTRUCTIONS
Check glucose before breakfast and before dinner Take Lantus 30 units daily in the morning Take tablets in the morning as well.

## 2022-01-24 ENCOUNTER — TELEPHONE (OUTPATIENT)
Dept: ENDOCRINOLOGY | Age: 57
End: 2022-01-24

## 2022-01-24 DIAGNOSIS — Z79.4 TYPE 2 DIABETES MELLITUS WITH HYPERGLYCEMIA, WITH LONG-TERM CURRENT USE OF INSULIN (HCC): ICD-10-CM

## 2022-01-24 DIAGNOSIS — E11.65 TYPE 2 DIABETES MELLITUS WITH HYPERGLYCEMIA, WITH LONG-TERM CURRENT USE OF INSULIN (HCC): ICD-10-CM

## 2022-01-24 RX ORDER — INSULIN GLARGINE 100 [IU]/ML
INJECTION, SOLUTION SUBCUTANEOUS
Qty: 3 ADJUSTABLE DOSE PRE-FILLED PEN SYRINGE | Refills: 3 | Status: SHIPPED | OUTPATIENT
Start: 2022-01-24 | End: 2022-02-23 | Stop reason: SDUPTHER

## 2022-01-24 RX ORDER — PIOGLITAZONEHYDROCHLORIDE 45 MG/1
45 TABLET ORAL DAILY
Qty: 30 TABLET | Refills: 3 | Status: SHIPPED | OUTPATIENT
Start: 2022-01-24 | End: 2022-02-23 | Stop reason: SDUPTHER

## 2022-01-24 NOTE — TELEPHONE ENCOUNTER
Patient called in stated that she has ran out of her Lantus and Pioglitazone, she is asking if you can send a refill to her pharmacy.

## 2022-02-23 ENCOUNTER — OFFICE VISIT (OUTPATIENT)
Dept: ENDOCRINOLOGY | Age: 57
End: 2022-02-23
Payer: MEDICAID

## 2022-02-23 VITALS
WEIGHT: 210 LBS | HEART RATE: 68 BPM | BODY MASS INDEX: 41.23 KG/M2 | TEMPERATURE: 97.1 F | HEIGHT: 60 IN | SYSTOLIC BLOOD PRESSURE: 136 MMHG | DIASTOLIC BLOOD PRESSURE: 79 MMHG | OXYGEN SATURATION: 98 %

## 2022-02-23 DIAGNOSIS — Z79.4 TYPE 2 DIABETES MELLITUS WITH HYPERGLYCEMIA, WITH LONG-TERM CURRENT USE OF INSULIN (HCC): Primary | ICD-10-CM

## 2022-02-23 DIAGNOSIS — I10 BENIGN ESSENTIAL HTN: ICD-10-CM

## 2022-02-23 DIAGNOSIS — E78.2 MIXED HYPERLIPIDEMIA: ICD-10-CM

## 2022-02-23 DIAGNOSIS — E11.65 TYPE 2 DIABETES MELLITUS WITH HYPERGLYCEMIA, WITH LONG-TERM CURRENT USE OF INSULIN (HCC): Primary | ICD-10-CM

## 2022-02-23 LAB
GLUCOSE POC: 106 MG/DL
HBA1C MFR BLD HPLC: 8.9 %

## 2022-02-23 PROCEDURE — 99214 OFFICE O/P EST MOD 30 MIN: CPT | Performed by: INTERNAL MEDICINE

## 2022-02-23 PROCEDURE — 83036 HEMOGLOBIN GLYCOSYLATED A1C: CPT | Performed by: INTERNAL MEDICINE

## 2022-02-23 PROCEDURE — 3052F HG A1C>EQUAL 8.0%<EQUAL 9.0%: CPT | Performed by: INTERNAL MEDICINE

## 2022-02-23 PROCEDURE — 82962 GLUCOSE BLOOD TEST: CPT | Performed by: INTERNAL MEDICINE

## 2022-02-23 RX ORDER — INSULIN GLARGINE 100 [IU]/ML
INJECTION, SOLUTION SUBCUTANEOUS
Qty: 3 ADJUSTABLE DOSE PRE-FILLED PEN SYRINGE | Refills: 3 | Status: SHIPPED | OUTPATIENT
Start: 2022-02-23

## 2022-02-23 RX ORDER — PIOGLITAZONEHYDROCHLORIDE 45 MG/1
45 TABLET ORAL DAILY
Qty: 30 TABLET | Refills: 3 | Status: SHIPPED | OUTPATIENT
Start: 2022-02-23 | End: 2022-06-27

## 2022-02-23 RX ORDER — BLOOD-GLUCOSE CONTROL, NORMAL
EACH MISCELLANEOUS
Qty: 100 LANCET | Refills: 3 | Status: SHIPPED | OUTPATIENT
Start: 2022-02-23

## 2022-02-23 RX ORDER — CALCIUM CITRATE/VITAMIN D3 200MG-6.25
TABLET ORAL SEE ADMIN INSTRUCTIONS
Qty: 100 STRIP | Refills: 3 | Status: SHIPPED | OUTPATIENT
Start: 2022-02-23

## 2022-02-23 RX ORDER — LANOLIN ALCOHOL/MO/W.PET/CERES
1000 CREAM (GRAM) TOPICAL DAILY
COMMUNITY
Start: 2022-02-08

## 2022-02-23 RX ORDER — METHOCARBAMOL 750 MG/1
TABLET, FILM COATED ORAL
COMMUNITY

## 2022-02-23 RX ORDER — PEN NEEDLE, DIABETIC 30 GX3/16"
NEEDLE, DISPOSABLE MISCELLANEOUS
Qty: 100 EACH | Refills: 3 | Status: SHIPPED | OUTPATIENT
Start: 2022-02-23

## 2022-02-23 RX ORDER — ISOPROPYL ALCOHOL 70 ML/100ML
SWAB TOPICAL
Qty: 200 PAD | Refills: 3 | Status: SHIPPED | OUTPATIENT
Start: 2022-02-23

## 2022-02-23 RX ORDER — ROSUVASTATIN CALCIUM 20 MG/1
20 TABLET, COATED ORAL
Qty: 30 TABLET | Refills: 3 | Status: SHIPPED | OUTPATIENT
Start: 2022-02-23 | End: 2022-06-27

## 2022-02-23 RX ORDER — DULAGLUTIDE 0.75 MG/.5ML
0.75 INJECTION, SOLUTION SUBCUTANEOUS
Qty: 2 ML | Refills: 3 | Status: SHIPPED | OUTPATIENT
Start: 2022-02-23 | End: 2022-07-15

## 2022-02-23 RX ORDER — SERTRALINE HYDROCHLORIDE 50 MG/1
TABLET, FILM COATED ORAL
COMMUNITY
Start: 2022-01-26

## 2022-02-23 NOTE — PROGRESS NOTES
History and Physical    Patient: Alesha Mccarty MRN: [id number]  SSN: [id number]    YOB: 1965  Age: 61 y.o. Sex: female      Subjective:      Alesha Mccarty is a 61 y.o. female with past medical history of hypertension, hyperlipidemia, CVA is here for follow-up of type 2 diabetes mellitus. She was ent to me by primary care provider Norm Adames NP. 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 She was found to have cocaine positive in her tox screen. She still has residual issues with balance while walking as well as numbness around her nose and mouth. I am seeing this patient after 9 months. She is taking Lantus 28 units in the morning, pioglitazone 45 mg daily. She was also on Januvia 100 mg daily but she is not on it, not sure why. She is trying to watch her diet, not drinking any sugary beverages, avoiding sweets. Checking blood glucose 1-2 times per day. She tells me that since the last visit she had another stroke. She also had GI bleed. She had diabetic eye exam in May 2021, she was told there are some spots in her retina and she was referred to retina specialist but she never heard back. She was also supposed to have some kind of cardiac monitor placed to check for atrial fibrillation but she tells me she has not heard back about this as well. She has gained 18 pounds since last visit. Has a lot of constipation, hemorrhoids flareup frequently. Memory is poor. She was on atorvastatin 40 mg but it was giving her a lot of constipation so she stopped taking it and constipation resolved. Glucometer reading: Checking blood glucose 1-2 times per day.   Readings are ranging from 99 to 315 mg/dL    Updated diabetes history:  · Diagnosis: 8 years    · Current treatment: Lantus 28 units in the morning, pioglitazone 45 mg daily    · Past treatment: metformin ER (constipation and hemorrhoids flaring up), Victoza (decreased appetite that patient doesn't llike), Januvia (not sure)    · Glucose checks: once a day, 200-300s    · Hyperglycemia: yes    · Hypoglycemia: no    · Meals per day: 3, breakfast: sausage, and biscuit, egg, lunch: turkey sandwich, or lunch meat, etc sandwich, dinner: same as lunch: snacks: snacks all day and night, junk food, candy bars, cereal, fruit, sweet tea,     · Exercise: walks    · DM related hospitalizations: no        Complications of DM:    · CAD: no    · CVA: yes, ,     · PVD: no    · Amputations: no     · Retinopathy: no; last exam was May 2021    · Gastropathy: no    · Nephropathy: no    · Neuropathy: yes        Medications:    · Statin: Atorvastatin 40 mg    · ACE-I: no    · ASA: No, stopped after she had pontine hemorrhage      · Diabetes education: no    Past Medical History:   Diagnosis Date    History of recent dental procedure 2020    Hyperlipidemia 2020    Hypertensive disorder 2020    Murmur     Stroke [facility])     Type II diabetes mellitus, uncontrolled ([facility] [address] 2020     Past Surgical History:   Procedure Laterality Date    HX APPENDECTOMY      HX  SECTION      HX ORTHOPAEDIC        Family History   Problem Relation Age of Onset    Diabetes Mother     Diabetes Paternal Grandmother      Social History     Tobacco Use    Smoking status: Current Every Day Smoker     Packs/day: 0.50    Smokeless tobacco: Never Used   Substance Use Topics    Alcohol use: Yes     Comment: daily      Prior to Admission medications    Medication Sig Start Date End Date Taking? Authorizing Provider   cyanocobalamin 1,000 mcg tablet Take 1,000 mcg by mouth daily.  22  Yes Provider, Historical   methocarbamoL (ROBAXIN) 750 mg tablet 1-2 tablets as needed for muscle spasm   Yes Provider, Historical   sertraline (ZOLOFT) 50 mg tablet Take 1 tablet by mouth once a day with food 22  Yes Provider, Historical   dulaglutide (Trulicity) 6.24 TR/2.2 mL sub-q pen 0.5 mL by SubCUTAneous route every seven (7) days for 30 days. 2/23/22 3/25/22 Yes Vianney Dukes MD   insulin glargine (Lantus Solostar U-100 Insulin) 100 unit/mL (3 mL) inpn 28 units every morning 2/23/22  Yes Vianney Dukes MD   pioglitazone (ACTOS) 45 mg tablet Take 1 Tablet by mouth daily for 30 days. 2/23/22 3/25/22 Yes Vianney Dukes MD   rosuvastatin (CRESTOR) 20 mg tablet Take 1 Tablet by mouth nightly for 30 days. 2/23/22 3/25/22 Yes Vianney Dukes MD   Insulin Needles, Disposable, (BD Ultra-Fine Short Pen Needle) 31 gauge x 5/16\" ndle Once a day 2/23/22  Yes Vianney Dukes MD   glucose blood VI test strips (True Metrix Glucose Test Strip) strip by Does Not Apply route See Admin Instructions. Twice a day 2/23/22  Yes Vianney Dukes MD   lancets (Ultra-Care Lancets) 30 gauge misc Twice a day 2/23/22  Yes Vianney Dukes MD   alcohol swabs (Alcohol Pads) padm Use 2 pads twice a day 2/23/22  Yes Vianney Dukes MD   ergocalciferol (ERGOCALCIFEROL) 1,250 mcg (50,000 unit) capsule take one capsule by mouth once a week 4/21/21  Yes Provider, Historical   amLODIPine (NORVASC) 10 mg tablet Take 1 Tab by mouth daily. 4/21/21  Yes Maddy Ingram MD   labetaloL (NORMODYNE) 200 mg tablet Take 1 Tab by mouth two (2) times a day. Indications: high blood pressure 4/20/21  Yes Maddy Ingram MD   ofloxacin (FLOXIN) 0.3 % ophthalmic solution instill 10 DROPS into THE affected ear ONCE A DAY FOR 7 DAYS 2/9/21  Yes Provider, Historical   acetaminophen (TYLENOL) 500 mg tablet Take  by mouth every six (6) hours as needed for Pain. Yes Provider, Historical   irbesartan (AVAPRO) 150 mg tablet Take 150 mg by mouth nightly. Yes Provider, Historical   Lancing Device misc by Does Not Apply route. Yes Provider, Historical   latanoprost (XALATAN) 0.005 % ophthalmic solution Administer 1 Drop to both eyes nightly. Yes Provider, Historical   meclizine (ANTIVERT) 25 mg tablet Take  by mouth three (3) times daily as needed for Dizziness.    Yes Provider, Historical   omeprazole (PRILOSEC) 20 mg capsule Take 20 mg by mouth daily. Yes Provider, Historical        Allergies   Allergen Reactions    Atorvastatin Calcium Unknown (comments)    Codeine Unknown (comments)    Oxycodone Hcl Itching    Penicillins Other (comments)     Yeast Infection    Percocet [Oxycodone-Acetaminophen] Hives    Propoxyphene Hcl Unknown (comments)       Review of Systems:  ROS    A comprehensive review of systems was preformed and it is negative except mentioned in HPI    Objective:     Vitals:    02/23/22 1525 02/23/22 1533   BP: (!) 141/80 136/79   Pulse: 70 68   Temp: 97.1 °F (36.2 °C)    TempSrc: Temporal    SpO2: 98%    Weight: 210 lb (95.3 kg)    Height: 5' (1.524 m)         Physical Exam:    Physical Exam  Vitals and nursing note reviewed. Constitutional:       Appearance: Normal appearance. HENT:      Head: Normocephalic and atraumatic. Cardiovascular:      Rate and Rhythm: Normal rate and regular rhythm. Pulmonary:      Effort: Pulmonary effort is normal.      Breath sounds: Normal breath sounds. Neurological:      Mental Status: She is alert. 3-:   diabetic foot exam:  Bilateral diabetic foot exam was performed today. Dorsalis pedis pulses 2+ bilaterally. Monofilament sensation normal bilaterally. No ulcers or skin breakdown. Labs and Imaging:  Results for Keeley Barrettlim (MRN [id number]) as of 5/13/2021 13:22   Ref.  Range 3/11/2021 11:32   Sodium Latest Ref Range: 134 - 144 mmol/L 136   Potassium Latest Ref Range: 3.5 - 5.2 mmol/L 4.5   Chloride Latest Ref Range: 96 - 106 mmol/L 97   CO2 Latest Ref Range: 20 - 29 mmol/L 25   Glucose Latest Ref Range: 65 - 99 mg/dL 317 (H)   BUN Latest Ref Range: 6 - 24 mg/dL 11   Creatinine Latest Ref Range: 0.57 - 1.00 mg/dL 0.99   BUN/Creatinine ratio Latest Ref Range: 9 - 23  11   Calcium Latest Ref Range: 8.7 - 10.2 mg/dL 9.6   GFR est non-AA Latest Ref Range: >59 mL/min/1.73 63   GFR est AA Latest Ref Range: >59 mL/min/1.73 73 Bilirubin, total Latest Ref Range: 0.0 - 1.2 mg/dL 0.2   Protein, total Latest Ref Range: 6.0 - 8.5 g/dL 7.0   Albumin Latest Ref Range: 3.8 - 4.9 g/dL 4.0   A-G Ratio Latest Ref Range: 1.2 - 2.2  1.3   ALT Latest Ref Range: 0 - 32 IU/L 26   AST Latest Ref Range: 0 - 40 IU/L 17   Alk. phosphatase Latest Ref Range: 39 - 117 IU/L 128 (H)   Results for Erum Donnelly (MRN [id number]) as of 5/13/2021 13:22   Ref. Range 3/11/2021 11:32   Triglyceride Latest Ref Range: 0 - 149 mg/dL 119   Cholesterol, total Latest Ref Range: 100 - 199 mg/dL 219 (H)   HDL Cholesterol Latest Ref Range: >39 mg/dL 44   VLDL, calculated Latest Ref Range: 5 - 40 mg/dL 21   LDL, calculated Latest Ref Range: 0 - 99 mg/dL 154 (H)   Results for Erum Donnelly (MRN [id number]) as of 5/13/2021 13:22   Ref. Range 3/11/2021 11:32   Creatinine, urine Latest Ref Range: Not Estab. mg/dL 167.6   Microalbumin, urine Latest Ref Range: Not Estab. ug/mL 10.2   Microalbumin/Creat. Ratio Latest Ref Range: 0 - 29 mg/g creat 6   Results for Erum Donnelly (MRN [id number]) as of 5/13/2021 13:22   Ref. Range 3/11/2021 11:32   TSH Latest Ref Range: 0.450 - 4.500 uIU/mL 0.952     Last 3 Recorded Weights in this Encounter    02/23/22 1525   Weight: 210 lb (95.3 kg)        Lab Results   Component Value Date/Time    Hemoglobin A1c 12.4 (H) 04/17/2021 05:33 AM        Assessment:     Patient Active Problem List   Diagnosis Code    Type II diabetes mellitus, uncontrolled ([facility] [address] E11.65    Hyperlipidemia E78.5    Hypertensive disorder I10    History of recent dental procedure Z98.890    Type 2 diabetes mellitus with hyperglycemia, with long-term current use of insulin (HCC) E11.65, Z79.4    Noncompliance with medication regimen Z91.14    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     Fingerstick blood glucose is 106 mg/dL in my office today. Up to date with diabetes related annual labs: Patient had CMP and lipid profile in 2022    Up to date with diabetic eye exam:     Plan:  Glucose control has improved but is not adequate. Encourage patient to continue to work on diet. Continue Lantus 28 units in the morning and pioglitazone 45 mg daily. Start Trulicity 4.28 mg weekly. Check blood glucose twice a day every day and bring glucometer to next visit in 3 months. Check TSH as she is overdue and she is having a lot of constipation, weight gain and memory issues. essential hypertension  Blood pressure well controlled on current medications. No microalbuminuria last checked in 2021. mixed hyperlipidemia  Previously on atorvastatin 40 mg, which was switched to pravastatin 20 mg by PCP. However, patient has hair fall from pravastatin. 3-: Total cholesterol 219, triglycerides 119, . Switched back to atorvastatin 40 mg daily. She is taking this in the morning, advised patient to take this at bedtime. Patient stopped taking atorvastatin a few months back because it was giving her constipation. 2022: Total cholesterol 205, triglycerides 151, . Discussed with patient that she needs a statin because of LDL elevation, to prevent another stroke. Start rosuvastatin 20 mg at bedtime. Substance abuse  Tox screen positive for cocaine in 2021 when patient was found to have pontine hemorrhage    History of CVA  Old with no residual deficits  Pontine hemorrhage in 2021    Noncompliance with medication regimen    Orders Placed This Encounter    TSH RFX ON ABNORMAL TO FREE T4    AMB POC GLUCOSE BLOOD, BY GLUCOSE MONITORING DEVICE    AMB POC HEMOGLOBIN A1C    dulaglutide (Trulicity) 7.69 VN/1.1 mL sub-q pen     Si.5 mL by SubCUTAneous route every seven (7) days for 30 days.      Dispense:  2 mL     Refill:  3    insulin glargine (Lantus Solostar U-100 Insulin) 100 unit/mL (3 mL) inpn     Si units every morning     Dispense:  3 Adjustable Dose Pre-filled Pen Syringe     Refill:  3    pioglitazone (ACTOS) 45 mg tablet     Sig: Take 1 Tablet by mouth daily for 30 days. Dispense:  30 Tablet     Refill:  3    rosuvastatin (CRESTOR) 20 mg tablet     Sig: Take 1 Tablet by mouth nightly for 30 days. Dispense:  30 Tablet     Refill:  3     DC atorvastatin    Insulin Needles, Disposable, (BD Ultra-Fine Short Pen Needle) 31 gauge x 5/16\" ndle     Sig: Once a day     Dispense:  100 Each     Refill:  3    glucose blood VI test strips (True Metrix Glucose Test Strip) strip     Sig: by Does Not Apply route See Admin Instructions.  Twice a day     Dispense:  100 Strip     Refill:  3    lancets (Ultra-Care Lancets) 30 gauge misc     Sig: Twice a day     Dispense:  100 Lancet     Refill:  3    alcohol swabs (Alcohol Pads) padm     Sig: Use 2 pads twice a day     Dispense:  200 Pad     Refill:  3        Signed By: Sorin Ashraf MD     2022      Return to clinic 3 months

## 2022-02-24 LAB — TSH SERPL DL<=0.005 MIU/L-ACNC: 1.37 UIU/ML (ref 0.45–4.5)

## 2022-02-25 ENCOUNTER — TELEPHONE (OUTPATIENT)
Dept: ENDOCRINOLOGY | Age: 57
End: 2022-02-25

## 2022-02-25 NOTE — TELEPHONE ENCOUNTER
Pt has been informed  ----- Message from Ellen Plunektt MD sent at 2/24/2022  8:48 AM EST -----  Normal thyroid function test.

## 2022-03-18 PROBLEM — E11.65 TYPE 2 DIABETES MELLITUS WITH HYPERGLYCEMIA, WITH LONG-TERM CURRENT USE OF INSULIN (HCC): Status: ACTIVE | Noted: 2021-03-11

## 2022-03-18 PROBLEM — Z79.4 TYPE 2 DIABETES MELLITUS WITH HYPERGLYCEMIA, WITH LONG-TERM CURRENT USE OF INSULIN (HCC): Status: ACTIVE | Noted: 2021-03-11

## 2022-03-18 PROBLEM — F14.10 COCAINE ABUSE (HCC): Status: ACTIVE | Noted: 2021-05-13

## 2022-03-18 PROBLEM — I61.3 PONTINE HEMORRHAGE (HCC): Status: ACTIVE | Noted: 2021-05-13

## 2022-03-19 PROBLEM — Z98.890 HISTORY OF RECENT DENTAL PROCEDURE: Status: ACTIVE | Noted: 2020-07-29

## 2022-03-19 PROBLEM — I10 HYPERTENSIVE DISORDER: Status: ACTIVE | Noted: 2020-07-29

## 2022-03-19 PROBLEM — E78.5 HYPERLIPIDEMIA: Status: ACTIVE | Noted: 2020-07-29

## 2022-03-19 PROBLEM — I10 BENIGN ESSENTIAL HTN: Status: ACTIVE | Noted: 2022-02-23

## 2022-03-20 PROBLEM — Z91.148 NONCOMPLIANCE WITH MEDICATION REGIMEN: Status: ACTIVE | Noted: 2021-03-11

## 2023-05-20 RX ORDER — OFLOXACIN 3 MG/ML
SOLUTION/ DROPS OPHTHALMIC
COMMUNITY
Start: 2021-02-09

## 2023-05-20 RX ORDER — IRBESARTAN 150 MG/1
150 TABLET ORAL
COMMUNITY

## 2023-05-20 RX ORDER — OMEPRAZOLE 20 MG/1
20 CAPSULE, DELAYED RELEASE ORAL DAILY
COMMUNITY

## 2023-05-20 RX ORDER — ERGOCALCIFEROL 1.25 MG/1
1 CAPSULE ORAL WEEKLY
COMMUNITY
Start: 2021-04-21

## 2023-05-20 RX ORDER — ROSUVASTATIN CALCIUM 20 MG/1
1 TABLET, COATED ORAL NIGHTLY
COMMUNITY
Start: 2022-06-27

## 2023-05-20 RX ORDER — LABETALOL 200 MG/1
200 TABLET, FILM COATED ORAL 2 TIMES DAILY
COMMUNITY
Start: 2021-04-20

## 2023-05-20 RX ORDER — AMLODIPINE BESYLATE 10 MG/1
10 TABLET ORAL DAILY
COMMUNITY
Start: 2021-04-21

## 2023-05-20 RX ORDER — DULAGLUTIDE 0.75 MG/.5ML
INJECTION, SOLUTION SUBCUTANEOUS
COMMUNITY
Start: 2022-07-15

## 2023-05-20 RX ORDER — INSULIN GLARGINE 100 [IU]/ML
INJECTION, SOLUTION SUBCUTANEOUS
COMMUNITY
Start: 2022-02-23

## 2023-05-20 RX ORDER — LANCING DEVICE
EACH MISCELLANEOUS
COMMUNITY

## 2023-05-20 RX ORDER — MECLIZINE HYDROCHLORIDE 25 MG/1
TABLET ORAL 3 TIMES DAILY PRN
COMMUNITY

## 2023-05-20 RX ORDER — LATANOPROST 50 UG/ML
1 SOLUTION/ DROPS OPHTHALMIC
COMMUNITY

## 2023-05-20 RX ORDER — METHOCARBAMOL 750 MG/1
TABLET, FILM COATED ORAL
COMMUNITY

## 2023-05-20 RX ORDER — ACETAMINOPHEN 500 MG
TABLET ORAL EVERY 6 HOURS PRN
COMMUNITY

## 2023-06-23 ENCOUNTER — ANESTHESIA EVENT (OUTPATIENT)
Facility: HOSPITAL | Age: 58
End: 2023-06-23
Payer: MEDICAID

## 2023-06-23 ENCOUNTER — ANESTHESIA (OUTPATIENT)
Facility: HOSPITAL | Age: 58
End: 2023-06-23
Payer: MEDICAID

## 2023-06-23 ENCOUNTER — HOSPITAL ENCOUNTER (OUTPATIENT)
Facility: HOSPITAL | Age: 58
Setting detail: OUTPATIENT SURGERY
Discharge: HOME | End: 2023-06-23
Attending: INTERNAL MEDICINE | Admitting: INTERNAL MEDICINE
Payer: MEDICAID

## 2023-06-23 VITALS
DIASTOLIC BLOOD PRESSURE: 94 MMHG | RESPIRATION RATE: 18 BRPM | HEART RATE: 81 BPM | OXYGEN SATURATION: 98 % | HEIGHT: 60 IN | SYSTOLIC BLOOD PRESSURE: 156 MMHG | TEMPERATURE: 97.2 F | BODY MASS INDEX: 45.35 KG/M2 | WEIGHT: 231 LBS

## 2023-06-23 DIAGNOSIS — Z12.11 COLON CANCER SCREENING: ICD-10-CM

## 2023-06-23 DIAGNOSIS — K21.9 GASTROESOPHAGEAL REFLUX DISEASE, UNSPECIFIED WHETHER ESOPHAGITIS PRESENT: ICD-10-CM

## 2023-06-23 DIAGNOSIS — R11.2 NAUSEA AND VOMITING, UNSPECIFIED VOMITING TYPE: ICD-10-CM

## 2023-06-23 DIAGNOSIS — R13.10 DYSPHAGIA, UNSPECIFIED TYPE: ICD-10-CM

## 2023-06-23 LAB
GLUCOSE BLD STRIP.AUTO-MCNC: 305 MG/DL (ref 65–100)
PERFORMED BY:: ABNORMAL

## 2023-06-23 PROCEDURE — 7100000010 HC PHASE II RECOVERY - FIRST 15 MIN: Performed by: INTERNAL MEDICINE

## 2023-06-23 PROCEDURE — 3600007512: Performed by: INTERNAL MEDICINE

## 2023-06-23 PROCEDURE — 3600007502: Performed by: INTERNAL MEDICINE

## 2023-06-23 PROCEDURE — 3700000000 HC ANESTHESIA ATTENDED CARE: Performed by: INTERNAL MEDICINE

## 2023-06-23 PROCEDURE — 2500000003 HC RX 250 WO HCPCS: Performed by: NURSE ANESTHETIST, CERTIFIED REGISTERED

## 2023-06-23 PROCEDURE — 7100000011 HC PHASE II RECOVERY - ADDTL 15 MIN: Performed by: INTERNAL MEDICINE

## 2023-06-23 PROCEDURE — 88342 IMHCHEM/IMCYTCHM 1ST ANTB: CPT

## 2023-06-23 PROCEDURE — 6360000002 HC RX W HCPCS: Performed by: NURSE ANESTHETIST, CERTIFIED REGISTERED

## 2023-06-23 PROCEDURE — 2580000003 HC RX 258: Performed by: INTERNAL MEDICINE

## 2023-06-23 PROCEDURE — 3700000001 HC ADD 15 MINUTES (ANESTHESIA): Performed by: INTERNAL MEDICINE

## 2023-06-23 PROCEDURE — 88305 TISSUE EXAM BY PATHOLOGIST: CPT

## 2023-06-23 PROCEDURE — 82962 GLUCOSE BLOOD TEST: CPT

## 2023-06-23 PROCEDURE — 2709999900 HC NON-CHARGEABLE SUPPLY: Performed by: INTERNAL MEDICINE

## 2023-06-23 RX ORDER — ASPIRIN 81 MG/1
81 TABLET ORAL DAILY
COMMUNITY

## 2023-06-23 RX ORDER — 0.9 % SODIUM CHLORIDE 0.9 %
500 INTRAVENOUS SOLUTION INTRAVENOUS ONCE
Status: DISCONTINUED | OUTPATIENT
Start: 2023-06-23 | End: 2023-06-23 | Stop reason: HOSPADM

## 2023-06-23 RX ORDER — PROPOFOL 10 MG/ML
INJECTION, EMULSION INTRAVENOUS PRN
Status: DISCONTINUED | OUTPATIENT
Start: 2023-06-23 | End: 2023-06-23 | Stop reason: SDUPTHER

## 2023-06-23 RX ORDER — SODIUM CHLORIDE, SODIUM LACTATE, POTASSIUM CHLORIDE, CALCIUM CHLORIDE 600; 310; 30; 20 MG/100ML; MG/100ML; MG/100ML; MG/100ML
INJECTION, SOLUTION INTRAVENOUS CONTINUOUS
Status: DISCONTINUED | OUTPATIENT
Start: 2023-06-23 | End: 2023-06-23 | Stop reason: HOSPADM

## 2023-06-23 RX ORDER — SODIUM CHLORIDE 9 MG/ML
INJECTION, SOLUTION INTRAVENOUS CONTINUOUS
Status: DISCONTINUED | OUTPATIENT
Start: 2023-06-23 | End: 2023-06-23 | Stop reason: HOSPADM

## 2023-06-23 RX ADMIN — PROPOFOL 100 MG: 10 INJECTION, EMULSION INTRAVENOUS at 14:09

## 2023-06-23 RX ADMIN — SODIUM CHLORIDE, POTASSIUM CHLORIDE, SODIUM LACTATE AND CALCIUM CHLORIDE: 600; 310; 30; 20 INJECTION, SOLUTION INTRAVENOUS at 11:27

## 2023-06-23 RX ADMIN — PROPOFOL 50 MG: 10 INJECTION, EMULSION INTRAVENOUS at 14:21

## 2023-06-23 RX ADMIN — PROPOFOL 50 MG: 10 INJECTION, EMULSION INTRAVENOUS at 14:10

## 2023-06-23 RX ADMIN — LIDOCAINE HYDROCHLORIDE 40 MG: 20 INJECTION, SOLUTION EPIDURAL; INFILTRATION; INTRACAUDAL; PERINEURAL at 14:09

## 2023-06-23 ASSESSMENT — LIFESTYLE VARIABLES: SMOKING_STATUS: 1

## 2023-06-23 ASSESSMENT — PAIN - FUNCTIONAL ASSESSMENT: PAIN_FUNCTIONAL_ASSESSMENT: NONE - DENIES PAIN

## 2023-06-23 NOTE — PERIOP NOTE
Patient alert and oriented x4, VS stable, no complaints of pain at this time. No one at bedside. Discharge instructions/education provided to daughter, Ritu Torres, she verbalized understanding and had no questions. Patient will be discharged in wheelchair to main entrance of hospital with daughter to home via private vehicle once Dr. Pita Ruiz talks with patient.

## 2023-06-23 NOTE — ANESTHESIA PRE PROCEDURE
431 04/19/2021 05:40 AM       CMP:   Lab Results   Component Value Date/Time     04/19/2021 05:40 AM    K 3.7 04/19/2021 05:40 AM     04/19/2021 05:40 AM    CO2 25 04/19/2021 05:40 AM    BUN 11 04/19/2021 05:40 AM    CREATININE 0.86 04/19/2021 05:40 AM    GFRAA >60 04/19/2021 05:40 AM    AGRATIO 0.7 04/16/2021 01:47 AM    GLUCOSE 166 04/19/2021 05:40 AM    PROT 7.0 04/16/2021 01:47 AM    CALCIUM 9.1 04/19/2021 05:40 AM    BILITOT 0.2 04/16/2021 01:47 AM    ALKPHOS 129 04/16/2021 01:47 AM    AST 15 04/16/2021 01:47 AM    ALT 30 04/16/2021 01:47 AM       POC Tests:   Recent Labs     06/23/23  1117   POCGLU 305*       Coags:   Lab Results   Component Value Date/Time    PROTIME 12.5 04/16/2021 02:04 AM    INR 0.9 04/16/2021 02:04 AM       HCG (If Applicable): No results found for: PREGTESTUR, PREGSERUM, HCG, HCGQUANT     ABGs: No results found for: PHART, PO2ART, PFD7FEF, QQE3KVJ, BEART, X5MNAWUG     Type & Screen (If Applicable):  No results found for: LABABO, LABRH    Drug/Infectious Status (If Applicable):  No results found for: HIV, HEPCAB    COVID-19 Screening (If Applicable): No results found for: COVID19        Anesthesia Evaluation  Patient summary reviewed and Nursing notes reviewed no history of anesthetic complications:   Airway: Mallampati: II  TM distance: >3 FB   Neck ROM: full  Mouth opening: > = 3 FB   Dental: normal exam         Pulmonary:normal exam  breath sounds clear to auscultation  (+) current smoker                           Cardiovascular:    (+) hypertension:, hyperlipidemia        Rhythm: regular  Rate: normal                    Neuro/Psych:   (+) CVA:,              ROS comment: Denies cocaine use in last month GI/Hepatic/Renal:   (+) morbid obesity          Endo/Other:    (+) Diabetes, . Abdominal:              PE comment: Deferred. Vascular: negative vascular ROS.          Other Findings:           Anesthesia Plan      MAC and TIVA     ASA 3     (Standard ASA

## 2023-06-23 NOTE — PERIOP NOTE
Dr. Andreea Mejia talked with patient then she was discharged to main [facility] with daughter to home.

## 2023-07-11 NOTE — ANESTHESIA POSTPROCEDURE EVALUATION
Department of Anesthesiology  Postprocedure Note    Patient: April R Radha Locke  MRN: [id number]  YOB: 1965      Procedure Summary     Date: 06/23/23 Room / Location: [facility] ENDO 03 / [facility] ENDOSCOPY    Anesthesia Start: 1350 Anesthesia Stop: 8075    Procedures:       EGD ESOPHAGOGASTRODUODENOSCOPY (Upper GI Region)      COLONOSCOPY DIAGNOSTIC (Lower GI Region)      EGD BIOPSY (Upper GI Region)      COLONOSCOPY POLYPECTOMY SNARE/COLD BIOPSY (Lower GI Region) Diagnosis:       Colon cancer screening      Dysphagia, unspecified type      Gastroesophageal reflux disease, unspecified whether esophagitis present      Nausea and vomiting, unspecified vomiting type      (Colon cancer screening [Z12.11])      (Dysphagia, unspecified type [R13.10])      (Gastroesophageal reflux disease, unspecified whether esophagitis present [K21.9])      (Nausea and vomiting, unspecified vomiting type [R11.2])    Surgeons: Dana Han MD Responsible Provider: Nadya Pritchard MD    Anesthesia Type: MAC ASA Status: 3          Anesthesia Type: MAC    Leanne Phase I: Leanne Score: 10    Leanne Phase II: Leanne Score: 10      Anesthesia Post Evaluation    Patient location during evaluation: bedside (Endoscopy Unit)  Patient participation: complete - patient participated  Level of consciousness: sleepy but conscious  Pain score: 0  Airway patency: patent  Nausea & Vomiting: no nausea and no vomiting  Complications: no  Cardiovascular status: hemodynamically stable  Respiratory status: acceptable  Hydration status: stable  Comments: This patient remained on the stretcher. The patient was handed off to the endoscopy nursing team.  All questions regarding pre-, intra-, and postoperative care were answered.   Multimodal analgesia pain management approach

## 2024-06-24 ENCOUNTER — OFFICE VISIT (OUTPATIENT)
Age: 59
End: 2024-06-24
Payer: MEDICAID

## 2024-06-24 VITALS
WEIGHT: 196.7 LBS | DIASTOLIC BLOOD PRESSURE: 85 MMHG | RESPIRATION RATE: 16 BRPM | HEIGHT: 60 IN | SYSTOLIC BLOOD PRESSURE: 133 MMHG | TEMPERATURE: 96.9 F | BODY MASS INDEX: 38.62 KG/M2 | HEART RATE: 84 BPM | OXYGEN SATURATION: 96 %

## 2024-06-24 DIAGNOSIS — E66.01 CLASS 2 SEVERE OBESITY DUE TO EXCESS CALORIES WITH SERIOUS COMORBIDITY AND BODY MASS INDEX (BMI) OF 38.0 TO 38.9 IN ADULT (HCC): ICD-10-CM

## 2024-06-24 DIAGNOSIS — E78.2 MIXED HYPERLIPIDEMIA: ICD-10-CM

## 2024-06-24 DIAGNOSIS — E11.65 TYPE 2 DIABETES MELLITUS WITH HYPERGLYCEMIA, WITH LONG-TERM CURRENT USE OF INSULIN (HCC): Primary | ICD-10-CM

## 2024-06-24 DIAGNOSIS — Z79.4 TYPE 2 DIABETES MELLITUS WITH HYPERGLYCEMIA, WITH LONG-TERM CURRENT USE OF INSULIN (HCC): Primary | ICD-10-CM

## 2024-06-24 LAB
GLUCOSE, POC: 189 MG/DL
HBA1C MFR BLD: 11.8 %

## 2024-06-24 PROCEDURE — 99213 OFFICE O/P EST LOW 20 MIN: CPT | Performed by: STUDENT IN AN ORGANIZED HEALTH CARE EDUCATION/TRAINING PROGRAM

## 2024-06-24 PROCEDURE — 3079F DIAST BP 80-89 MM HG: CPT | Performed by: STUDENT IN AN ORGANIZED HEALTH CARE EDUCATION/TRAINING PROGRAM

## 2024-06-24 PROCEDURE — 83036 HEMOGLOBIN GLYCOSYLATED A1C: CPT | Performed by: STUDENT IN AN ORGANIZED HEALTH CARE EDUCATION/TRAINING PROGRAM

## 2024-06-24 PROCEDURE — 3075F SYST BP GE 130 - 139MM HG: CPT | Performed by: STUDENT IN AN ORGANIZED HEALTH CARE EDUCATION/TRAINING PROGRAM

## 2024-06-24 PROCEDURE — 82962 GLUCOSE BLOOD TEST: CPT | Performed by: STUDENT IN AN ORGANIZED HEALTH CARE EDUCATION/TRAINING PROGRAM

## 2024-06-24 RX ORDER — GLUCOSAMINE HCL/CHONDROITIN SU 500-400 MG
CAPSULE ORAL
Qty: 200 STRIP | Refills: 3 | Status: SHIPPED | OUTPATIENT
Start: 2024-06-24

## 2024-06-24 RX ORDER — INSULIN GLARGINE-YFGN 100 [IU]/ML
INJECTION, SOLUTION SUBCUTANEOUS
COMMUNITY
Start: 2024-06-22 | End: 2024-06-24

## 2024-06-24 RX ORDER — LIRAGLUTIDE 6 MG/ML
INJECTION SUBCUTANEOUS
Qty: 2 ADJUSTABLE DOSE PRE-FILLED PEN SYRINGE | Refills: 3 | Status: SHIPPED | OUTPATIENT
Start: 2024-06-24

## 2024-06-24 RX ORDER — SPIRONOLACTONE 25 MG/1
25 TABLET ORAL DAILY
COMMUNITY

## 2024-06-24 RX ORDER — LOSARTAN POTASSIUM AND HYDROCHLOROTHIAZIDE 12.5; 5 MG/1; MG/1
1 TABLET ORAL DAILY
COMMUNITY
Start: 2024-06-14 | End: 2025-06-14

## 2024-06-24 RX ORDER — EZETIMIBE 10 MG/1
10 TABLET ORAL DAILY
COMMUNITY

## 2024-06-24 RX ORDER — GLIPIZIDE 10 MG/1
10 TABLET, FILM COATED, EXTENDED RELEASE ORAL
COMMUNITY

## 2024-06-24 RX ORDER — DOCUSATE SODIUM 100 MG/1
100 CAPSULE, LIQUID FILLED ORAL 2 TIMES DAILY PRN
COMMUNITY

## 2024-06-24 NOTE — PATIENT INSTRUCTIONS
For victoza : Inject the 0.6 mg daily for 2 weeks and then increase to 1.2 mg daily  Please check blood glucose twice a day

## 2024-06-24 NOTE — PROGRESS NOTES
1. \"Have you been to the ER, urgent care clinic since your last visit?  Hospitalized since your last visit?\" No    2. \"Have you seen or consulted any other health care providers outside of the [facility] since your last visit?\" Yes-pcp    3. For patients aged 45-75: Has the patient had a colonoscopy / FIT/ Cologuard? Yes      If the patient is female:    4. For patients aged 40-74: Has the patient had a mammogram within the past 2 years? Yes      5. For patients aged 21-65: Has the patient had a pap smear? Yes    Chief Complaint   Patient presents with    [facility]    Diabetes       
distress, good eye contact  HEENT: no pallor, no periorbital edema, EOMI  Neck: supple, no thyromegaly, no nodules  Cardiovascular: regular,  normal S1 and S2,   Respiratory: clear to auscultation bilaterally  Gastrointestinal: soft, nontender,   Musculoskeletal: no edema  Neurological: alert and oriented  Psychiatric: normal mood and affect    Data Reviewed:     Lab Results   Component Value Date/Time    GLUCPOC 189 06/24/2024 11:32 AM     04/16/2021 05:53 AM      Lab Results   Component Value Date/Time    GFRAA >60 04/19/2021 05:40 AM    BUN 11 04/19/2021 05:40 AM     04/19/2021 05:40 AM    K 3.7 04/19/2021 05:40 AM     04/19/2021 05:40 AM    CO2 25 04/19/2021 05:40 AM    MG 1.9 04/17/2021 05:33 AM       Results for orders placed or performed in visit on 06/24/24   AMB POC GLUCOSE BLOOD, BY GLUCOSE MONITORING DEVICE   Result Value Ref Range    Glucose,  MG/DL   AMB POC HEMOGLOBIN A1C   Result Value Ref Range    Hemoglobin A1C, POC 11.8 %         Assessment/Plan:     1. Type 2 diabetes mellitus with hyperglycemia, with long-term current use of insulin (HCC)      Results for orders placed or performed in visit on 06/24/24   AMB POC GLUCOSE BLOOD, BY GLUCOSE MONITORING DEVICE   Result Value Ref Range    Glucose,  MG/DL   AMB POC HEMOGLOBIN A1C   Result Value Ref Range    Hemoglobin A1C, POC 11.8 %   Recommendation   Will attempt victoza 0.6 mg/day x 2 weeks followed 1.2 mg/day    Continue Lantus 40 units daily   -Counseled on hypoglycemia  -Counseled on follow up with podiatry and ophthalmology   -Counseled on lifestyle measures   -refer to DM education       2. Mixed hyperlipidemia   LDL -197 Tg- 159   Has attempted 2 statins with s/e   On zetia   Consider low dose statin with escalation  May have to consider repatha       Orders Placed This Encounter   Procedures    AMB POC GLUCOSE BLOOD, BY GLUCOSE MONITORING DEVICE    AMB POC HEMOGLOBIN A1C     essential hypertension  Blood

## 2024-12-19 ENCOUNTER — OFFICE VISIT (OUTPATIENT)
Age: 59
End: 2024-12-19
Payer: MEDICAID

## 2024-12-19 VITALS
BODY MASS INDEX: 37.99 KG/M2 | HEIGHT: 60 IN | RESPIRATION RATE: 16 BRPM | OXYGEN SATURATION: 99 % | WEIGHT: 193.5 LBS | TEMPERATURE: 97.3 F | HEART RATE: 79 BPM | DIASTOLIC BLOOD PRESSURE: 92 MMHG | SYSTOLIC BLOOD PRESSURE: 152 MMHG

## 2024-12-19 DIAGNOSIS — E11.65 TYPE 2 DIABETES MELLITUS WITH HYPERGLYCEMIA, WITH LONG-TERM CURRENT USE OF INSULIN (HCC): Primary | ICD-10-CM

## 2024-12-19 DIAGNOSIS — Z79.4 TYPE 2 DIABETES MELLITUS WITH HYPERGLYCEMIA, WITH LONG-TERM CURRENT USE OF INSULIN (HCC): Primary | ICD-10-CM

## 2024-12-19 DIAGNOSIS — E66.812 CLASS 2 SEVERE OBESITY DUE TO EXCESS CALORIES WITH SERIOUS COMORBIDITY AND BODY MASS INDEX (BMI) OF 37.0 TO 37.9 IN ADULT: ICD-10-CM

## 2024-12-19 DIAGNOSIS — I10 PRIMARY HYPERTENSION: ICD-10-CM

## 2024-12-19 DIAGNOSIS — E78.2 MIXED HYPERLIPIDEMIA: ICD-10-CM

## 2024-12-19 DIAGNOSIS — E66.01 CLASS 2 SEVERE OBESITY DUE TO EXCESS CALORIES WITH SERIOUS COMORBIDITY AND BODY MASS INDEX (BMI) OF 37.0 TO 37.9 IN ADULT: ICD-10-CM

## 2024-12-19 DIAGNOSIS — Z91.199 NON-COMPLIANCE: ICD-10-CM

## 2024-12-19 PROCEDURE — 3077F SYST BP >= 140 MM HG: CPT | Performed by: STUDENT IN AN ORGANIZED HEALTH CARE EDUCATION/TRAINING PROGRAM

## 2024-12-19 PROCEDURE — 3080F DIAST BP >= 90 MM HG: CPT | Performed by: STUDENT IN AN ORGANIZED HEALTH CARE EDUCATION/TRAINING PROGRAM

## 2024-12-19 PROCEDURE — 99214 OFFICE O/P EST MOD 30 MIN: CPT | Performed by: STUDENT IN AN ORGANIZED HEALTH CARE EDUCATION/TRAINING PROGRAM

## 2024-12-19 RX ORDER — PEN NEEDLE, DIABETIC 30 GX3/16"
NEEDLE, DISPOSABLE MISCELLANEOUS
Qty: 400 EACH | Refills: 3 | Status: SHIPPED | OUTPATIENT
Start: 2024-12-19

## 2024-12-19 RX ORDER — INSULIN LISPRO 100 [IU]/ML
INJECTION, SOLUTION INTRAVENOUS; SUBCUTANEOUS
Qty: 5 ADJUSTABLE DOSE PRE-FILLED PEN SYRINGE | Refills: 1 | Status: SHIPPED | OUTPATIENT
Start: 2024-12-19

## 2024-12-19 RX ORDER — BLOOD SUGAR DIAGNOSTIC
STRIP MISCELLANEOUS
Qty: 300 EACH | Refills: 3 | Status: SHIPPED | OUTPATIENT
Start: 2024-12-19

## 2024-12-19 RX ORDER — BLOOD-GLUCOSE METER
1 EACH MISCELLANEOUS DAILY
Qty: 1 KIT | Refills: 0 | Status: SHIPPED | OUTPATIENT
Start: 2024-12-19

## 2024-12-19 NOTE — PROGRESS NOTES
Nataly Jaeger (:  1965) is a 59 y.o. female, {New vs Established:[id number]::\"Established patient\"}, here for evaluation of the following chief complaint(s):  New Patient and Diabetes         Assessment & Plan  1. Diabetes Mellitus.  Her A1c level is significantly elevated at 14.8%, indicating poor glycemic control. She is currently taking 40 units of Lantus insulin and glipizide. She was previously on Jardiance, which caused a yeast infection, leading to its discontinuation. She is advised to attend the diabetes class for better management of her condition. She is also advised to monitor her blood glucose levels 2 to 3 times daily and to contact the clinic once she has obtained the meter. She is encouraged to contact her insurance provider for transportation assistance to attend her appointments. A prescription for mealtime insulin (Humalog) has been issued, to be taken at a dosage of 8 units with each meal. Additionally, a correction scale has been provided, with a maximum daily dosage not exceeding 50 units. A prescription for a glucose meter and test strips has also been issued. She is advised to discontinue glipizide as it is not significantly contributing to her glycemic control. She is instructed to continue taking Lantus 40 units daily, preferably at night. If her blood sugar levels remain high, further adjustments to her insulin regimen will be considered.    2. Elevated blood pressure.  Her blood pressure is slightly elevated today, which she attributes to anxiety related to her granddaughter's driving.    Follow-up  The patient is scheduled for a follow-up visit in 6 weeks.    Results  Laboratory Studies  A1c was 14.8% in November. Average blood sugars are around 400.  {There are no diagnoses linked to this encounter. (Refresh or delete this SmartLink)}  No follow-ups on file.       Subjective   History of Present Illness  The patient presents for evaluation of diabetes.    She is currently on

## 2024-12-19 NOTE — PROGRESS NOTES
\"Have you been to the ER, urgent care clinic since your last visit?  Hospitalized since your last visit?\"    NO    “Have you seen or consulted any other health care providers outside our system since your last visit?”    Yes, PCP for follow up 12/2024, Psychiatry for follow up    Have you had a mammogram?”   YES - Where: [facility]    No breast cancer screening on file      “Have you had a pap smear?”    YES - Where: [facility]- PCP    No cervical cancer screening on file       “Have you had a diabetic eye exam?”    YES - Where:      No diabetic eye exam on file     Chief Complaint   Patient presents with    New Patient    Diabetes     BP (!) 152/92 (Site: Left Upper Arm, Position: Sitting, Cuff Size: Large Adult)   Pulse 79   Temp 97.3 °F (36.3 °C) (Temporal)   Resp 16   Ht 1.524 m (5')   Wt 87.8 kg (193 lb 8 oz)   SpO2 99%   BMI 37.79 kg/m²

## 2024-12-19 NOTE — PROGRESS NOTES
[facility]                                                                                    Mary Barrientos M.D          Patient Information  Date:12/22/2024  Name : Nataly Jaeger 59 y.o.     YOB: 1965         Referred by: Adrianna La MD       Chief Complaint   Patient presents with    New Patient    Diabetes       History of Present Illness: Nataly Jaeger is a 59 y.o. female with hypertension, hyperlipidemia, CVA is here for follow-up of type 2 diabetes mellitus. Last seen 7-2024, cancelled appt after that.      12-24 She is currently on a regimen of Lantus, administered at a dosage of 40 units. She has discontinued the use of Jardiance due to its association with recurrent yeast infections. Despite being referred to a diabetes education class, she was unable to attend due to transportation issues. She recently received four insulin pens from her pharmacy but did not receive the corresponding needles. She is uncertain about the size of the needles she typically uses. She had a consultation with her ophthalmologist on 04/26/2024, during which no ocular complications were reported. She is currently on a regimen of Lantus, administered at a dosage of 40 units. She has discontinued the use of Jardiance due to its association with recurrent yeast infections. She continues to take glipizide.    She reports an elevated blood pressure reading today, which she attributes to anxiety related to her granddaughter's driving.    Supplemental Information  She went to her doctor yesterday because she got a little rash and an opening and she got a little irritation. They did some culture yesterday and checked. She said if it is not from the yeast because she does not eat yogurt and none of that agrees with her. When the culture comes back, she hopes they get it today and call her, she will order some type of powder because she sweats a lot and opens it. They have given

## 2025-01-26 DIAGNOSIS — E11.65 TYPE 2 DIABETES MELLITUS WITH HYPERGLYCEMIA, WITH LONG-TERM CURRENT USE OF INSULIN (HCC): Primary | ICD-10-CM

## 2025-01-26 DIAGNOSIS — Z79.4 TYPE 2 DIABETES MELLITUS WITH HYPERGLYCEMIA, WITH LONG-TERM CURRENT USE OF INSULIN (HCC): Primary | ICD-10-CM

## 2025-01-27 RX ORDER — BLOOD-GLUCOSE METER
EACH MISCELLANEOUS
Qty: 1 KIT | Refills: 0 | Status: SHIPPED | OUTPATIENT
Start: 2025-01-27

## 2025-04-17 ENCOUNTER — TRANSCRIBE ORDERS (OUTPATIENT)
Facility: HOSPITAL | Age: 60
End: 2025-04-17

## 2025-04-17 DIAGNOSIS — Z12.31 OTHER SCREENING MAMMOGRAM: Primary | ICD-10-CM

## 2025-07-22 ENCOUNTER — TRANSCRIBE ORDERS (OUTPATIENT)
Facility: HOSPITAL | Age: 60
End: 2025-07-22

## 2025-07-22 DIAGNOSIS — Z87.891 PERSONAL HISTORY OF TOBACCO USE, PRESENTING HAZARDS TO HEALTH: Primary | ICD-10-CM

## 2025-07-22 DIAGNOSIS — F17.210 CIGARETTE SMOKER: ICD-10-CM

## (undated) DEVICE — CANNULA NASAL ADULT 10FT ETCO2/CO2 VENTFLO

## (undated) DEVICE — KIT ENDOSCOPIC  PROC VIA

## (undated) DEVICE — MASK ANES INF SZ 2 PREM TAIL VLV INFL PRT UNSCENTED SGL PT

## (undated) DEVICE — MOUTHPIECE ENDOSCP L CTRL OPN AND SIDE PORTS DISP